# Patient Record
Sex: FEMALE | Race: WHITE | NOT HISPANIC OR LATINO | Employment: PART TIME | URBAN - METROPOLITAN AREA
[De-identification: names, ages, dates, MRNs, and addresses within clinical notes are randomized per-mention and may not be internally consistent; named-entity substitution may affect disease eponyms.]

---

## 2017-09-25 ENCOUNTER — APPOINTMENT (OUTPATIENT)
Dept: LAB | Facility: MEDICAL CENTER | Age: 32
End: 2017-09-25
Payer: COMMERCIAL

## 2017-09-25 ENCOUNTER — LAB REQUISITION (OUTPATIENT)
Dept: LAB | Facility: HOSPITAL | Age: 32
End: 2017-09-25
Payer: COMMERCIAL

## 2017-09-25 ENCOUNTER — GENERIC CONVERSION - ENCOUNTER (OUTPATIENT)
Dept: OTHER | Facility: OTHER | Age: 32
End: 2017-09-25

## 2017-09-25 DIAGNOSIS — Z11.3 ENCOUNTER FOR SCREENING FOR INFECTIONS WITH PREDOMINANTLY SEXUAL MODE OF TRANSMISSION: ICD-10-CM

## 2017-09-25 DIAGNOSIS — Z11.51 ENCOUNTER FOR SCREENING FOR HUMAN PAPILLOMAVIRUS (HPV): ICD-10-CM

## 2017-09-25 DIAGNOSIS — N92.6 IRREGULAR MENSTRUATION: ICD-10-CM

## 2017-09-25 DIAGNOSIS — Z01.419 ENCOUNTER FOR GYNECOLOGICAL EXAMINATION WITHOUT ABNORMAL FINDING: ICD-10-CM

## 2017-09-25 LAB — TSH SERPL DL<=0.05 MIU/L-ACNC: 1.66 UIU/ML (ref 0.36–3.74)

## 2017-09-25 PROCEDURE — 87624 HPV HI-RISK TYP POOLED RSLT: CPT | Performed by: PHYSICIAN ASSISTANT

## 2017-09-25 PROCEDURE — 87491 CHLMYD TRACH DNA AMP PROBE: CPT | Performed by: PHYSICIAN ASSISTANT

## 2017-09-25 PROCEDURE — 87591 N.GONORRHOEAE DNA AMP PROB: CPT | Performed by: PHYSICIAN ASSISTANT

## 2017-09-25 PROCEDURE — 36415 COLL VENOUS BLD VENIPUNCTURE: CPT

## 2017-09-25 PROCEDURE — 84443 ASSAY THYROID STIM HORMONE: CPT

## 2017-09-25 PROCEDURE — G0145 SCR C/V CYTO,THINLAYER,RESCR: HCPCS | Performed by: PHYSICIAN ASSISTANT

## 2017-09-28 LAB
CHLAMYDIA DNA CVX QL NAA+PROBE: NORMAL
N GONORRHOEA DNA GENITAL QL NAA+PROBE: NORMAL

## 2017-10-02 LAB — HPV RRNA GENITAL QL NAA+PROBE: NORMAL

## 2017-10-10 ENCOUNTER — GENERIC CONVERSION - ENCOUNTER (OUTPATIENT)
Dept: OTHER | Facility: OTHER | Age: 32
End: 2017-10-10

## 2017-10-10 LAB
LAB AP GYN PRIMARY INTERPRETATION: NORMAL
LAB AP LMP: NORMAL
Lab: NORMAL
PATH INTERP SPEC-IMP: NORMAL

## 2017-10-25 ENCOUNTER — GENERIC CONVERSION - ENCOUNTER (OUTPATIENT)
Dept: OTHER | Facility: OTHER | Age: 32
End: 2017-10-25

## 2018-01-13 NOTE — MISCELLANEOUS
Message   Recorded as Task   Date: 10/25/2017 12:41 PM, Created By: Moreno Thorne   Task Name: Medical Complaint Callback   Assigned To: Sonya Durant   Regarding Patient: Portia Lee, Status: In Progress   Comment:    Meena Smith - 25 Oct 2017 12:41 PM     TASK CREATED  Pt called office today, left voicemail message today  Pt saw YOSVANY Manilya on 9/25/17, scheduled to see her again on 10/1/18  Pt states her period arrived shortly after office visit  Pt states she is still bleeding, passing clots, is concerned  Pt further states she stopped receiving Depo-Provera injections about 1 &  1/2 years ago, menses regular until 7/2017 (menses irregular for past two months)  Pt can be reached @ [phone]  Thank you! [facility] [employer] - 25 Oct 2017 1:00 PM     TASK IN PROGRESS   [facility] [employer] - 25 Oct 2017 1:10 PM     TASK EDITED  Pt began bleeding 9/26  Had not had menses in 3 mos  Said she did lots of pregnancy tests 6 weeks ago and not sexually active since? ??  Pt bled heavily 9/26 for 1 week - bled lightly - now bleeding very heavily again  Used 4 this am  Pt passing clots  Pt doing hpt and will cb   [facility] [employer] - 25 Oct 2017 1:11 PM     TASK EDITED  Await cb   [facility] [employer] - 25 Oct 2017 1:35 PM     TASK EDITED  Pt says she has been bleeding for 1 full mo - off and on  [facility] - 25 Oct 2017 1:46 PM     TASK EDITED  Pts hpt neg  What to do??   Lety Cheung - 25 Oct 2017 2:07 PM     TASK REPLIED TO: Previously Assigned To [address] [place] [address]  did pt ever go for US I ordered? If not go for that and order CBC as well, then needs office visit  [facility] [employer] - 25 Oct 2017 2:28 PM     TASK EDITED  Pt will have u/s - and cbc  Slip faxed to [facility] CARRERA  Pt given ov        Active Problems    1  Birth control (V25 9) (Z30 9)   2  Encounter for gynecological examination without abnormal finding (V72 31) (Z01 419)   3   Encounter for routine gynecological examination with Papanicolaou smear of cervix   (V72 31,V76 2) (Z01 419)   4  General counseling for initiation of other contraceptive measures (V25 02) (Z30 09)   5  Irregular menses (626 4) (N92 6)   6  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)   7  Post concussion syndrome (310 2) (F07 81)   8  Scar of eyelid (374 46) (H02 59)   9  Screening for HPV (human papillomavirus) (V73 81) (Z11 51)   10  Screening for STD (sexually transmitted disease) (V74 5) (Z11 3)    Current Meds   1  BusPIRone HCl - 15 MG Oral Tablet; Therapy: (Recorded:[id number]) to Recorded    Allergies    1  No Known Drug Allergies    2  No Known Environmental Allergies   3  No Known Food Allergies    Plan  Irregular menses    · (1) CBC/PLT/DIFF; Status:Active - Retrospective Authorization; Requested  [id number];     Signatures   Electronically signed by :  Richi Del Toro, ; Oct 25 2017  2:30PM EST                       (Author)

## 2018-01-18 NOTE — MISCELLANEOUS
Message   Recorded as Task   Date: 10/10/2017 01:32 PM, Created By: Sebastian Ibarra   Task Name: Go to Result   Assigned To: [facility],Team   Regarding Patient: Mahi Gaitan, Status: In Progress   Comment:    Lauren Milner - 10 Oct 2017 1:32 PM     TASK CREATED  pap ascus neg hpv - will repeat pap in 3 years  This is ermelinda's pt   Lisa Smith - 10 Oct 2017 1:32 PM     TASK IN PROGRESS   Lisa Smith - 10 Oct 2017 1:34 PM     TASK EDITED  Per HIPPA form - lm of pap results and W87 recommendations  Active Problems    1  Birth control (V25 9) (Z30 9)   2  Encounter for gynecological examination without abnormal finding (V72 31) (Z01 419)   3  Encounter for routine gynecological examination with Papanicolaou smear of cervix   (V72 31,V76 2) (Z01 419)   4  General counseling for initiation of other contraceptive measures (V25 02) (Z30 09)   5  Irregular menses (626 4) (N92 6)   6  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

## 2018-01-22 VITALS
SYSTOLIC BLOOD PRESSURE: 106 MMHG | HEIGHT: 67 IN | BODY MASS INDEX: 28.72 KG/M2 | DIASTOLIC BLOOD PRESSURE: 64 MMHG | WEIGHT: 183 LBS

## 2018-03-28 ENCOUNTER — OFFICE VISIT (OUTPATIENT)
Dept: URGENT CARE | Facility: MEDICAL CENTER | Age: 33
End: 2018-03-28
Payer: COMMERCIAL

## 2018-03-28 VITALS
BODY MASS INDEX: 30 KG/M2 | HEART RATE: 70 BPM | HEIGHT: 67 IN | SYSTOLIC BLOOD PRESSURE: 110 MMHG | RESPIRATION RATE: 20 BRPM | DIASTOLIC BLOOD PRESSURE: 66 MMHG | WEIGHT: 191.13 LBS | OXYGEN SATURATION: 99 % | TEMPERATURE: 97.6 F

## 2018-03-28 DIAGNOSIS — B86 SCABIES: Primary | ICD-10-CM

## 2018-03-28 PROCEDURE — S9088 SERVICES PROVIDED IN URGENT: HCPCS | Performed by: FAMILY MEDICINE

## 2018-03-28 PROCEDURE — 99213 OFFICE O/P EST LOW 20 MIN: CPT | Performed by: FAMILY MEDICINE

## 2018-03-28 RX ORDER — PERMETHRIN 50 MG/G
CREAM TOPICAL ONCE
Qty: 60 G | Refills: 0 | Status: SHIPPED | OUTPATIENT
Start: 2018-03-28 | End: 2018-03-28

## 2018-03-28 NOTE — PROGRESS NOTES
Started feeling itchy past 2 months   Spots on chest , back and arms didn't show up until past few days  C/o itchy worse at night

## 2018-03-28 NOTE — LETTER
March 28, 2018     Patient: Lionel Montenegro   YOB: 1985   Date of Visit: 3/28/2018       To Whom it May Concern:    Nella Platt was seen in my clinic on 3/28/2018  She may return to work on Please excuse illness  If you have any questions or concerns, please don't hesitate to call           Sincerely,          [facility]        CC: No Recipients

## 2018-03-28 NOTE — PROGRESS NOTES
[facility]        NAME: Megan Merino is a 27 y o  female  : 1985    MRN: [id number]  DATE: 2018  TIME: 10:28 AM    Assessment and Plan   Scabies [B86]  1  Scabies  permethrin (ELIMITE) 5 % cream         Patient Instructions     Use permethrin cream as directed  Follow up with PCP in 3-5 days  Proceed to  ER if symptoms worsen  Chief Complaint     Chief Complaint   Patient presents with    Rash     past 2 months          History of Present Illness       Rash   This is a new problem  The current episode started more than 1 month ago  The problem is unchanged  The affected locations include the left wrist, right wrist, back and chest  The rash is characterized by itchiness and redness  She was exposed to nothing  Pertinent negatives include no congestion, eye pain, fatigue, fever, shortness of breath, sore throat or vomiting  (Itching is worse at night  Itching relieved once skin is broken  ) There is no history of allergies, asthma, eczema or varicella  Review of Systems   Review of Systems   Constitutional: Negative for chills, fatigue and fever  HENT: Negative for congestion, ear pain, hearing loss, postnasal drip, sinus pain, sinus pressure and sore throat  Eyes: Negative for pain and discharge  Respiratory: Negative for chest tightness and shortness of breath  Cardiovascular: Negative for chest pain  Gastrointestinal: Negative for abdominal pain, constipation, nausea and vomiting  Genitourinary: Negative for difficulty urinating  Musculoskeletal: Negative for arthralgias and myalgias  Skin: Positive for rash  Neurological: Negative for dizziness and headaches  Psychiatric/Behavioral: Negative for behavioral problems           Current Medications       Current Outpatient Prescriptions:     permethrin (ELIMITE) 5 % cream, Apply topically once for 1 dose, Disp: 60 g, Rfl: 0    Current Allergies     Allergies as of 2018    (No Known Allergies)            The following portions of the patient's history were reviewed and updated as appropriate: allergies, current medications, past family history, past medical history, past social history, past surgical history and problem list      History reviewed  No pertinent past medical history  History reviewed  No pertinent surgical history  No family history on file  Medications have been verified  Objective   /66   Pulse 70   Temp 97 6 °F (36 4 °C) (Temporal)   Resp 20   Ht 5' 7" (1 702 m)   Wt 86 7 kg (191 lb 2 oz)   SpO2 99%   BMI 29 93 kg/m²        Physical Exam     Physical Exam   Constitutional: She is oriented to person, place, and time  She appears well-developed and well-nourished  No distress  Cardiovascular: Normal rate, regular rhythm and normal heart sounds  Pulmonary/Chest: Effort normal and breath sounds normal  No respiratory distress  She has no wheezes  She has no rales  Musculoskeletal: She exhibits no tenderness  Neurological: She is alert and oriented to person, place, and time  Skin: Rash noted  Rash is papular  Erythematous papular rash on b/l forearm, chest and lower back with scabbing  Nursing note and vitals reviewed

## 2018-03-28 NOTE — PATIENT INSTRUCTIONS
Use permethrin cream as directed  Scabies   WHAT YOU NEED TO KNOW:   Scabies is a skin condition that is caused by scabies mites  Scabies mites are tiny bugs that burrow, lay eggs, and live underneath the skin  Scabies is spread through close contact with a person who has scabies  This includes having sex, sleeping in the same bed, or sharing towels or clothing  Scabies can spread quickly and must be treated as soon as it is found  DISCHARGE INSTRUCTIONS:   Return to the emergency department if:   · You develop a fever and red, swollen, painful areas on your skin  Contact your healthcare provider if:   · The bites become crusty or filled with pus  · You have worsening itching after scabies treatment  · You have new bite or burrow marks after treatment  · You have questions or concerns about your condition or care  Medicines:   · Prescription creams  are used to treat scabies  You will need to apply them over all of your body from the neck down  Do not swallow this medicine  An oral medication may be ordered if scabies is severe  · Take your medicine as directed  Contact your healthcare provider if you think your medicine is not helping or if you have side effects  Tell him or her if you are allergic to any medicine  Keep a list of the medicines, vitamins, and herbs you take  Include the amounts, and when and why you take them  Bring the list or the pill bottles to follow-up visits  Carry your medicine list with you in case of an emergency  Follow up with your healthcare provider as directed:  Write down your questions so you remember to ask them during your visits  Prevent the spread of scabies:   · Have all family members use scabies medicine  Tell all sex partners and anyone who has shared your clothing or bed for the past month about the scabies  Tell them to ask their healthcare provider for scabies medicine even if they have no itching, rash, or burrow marks      · Wash all items that you have used since 3 days before you learned about your scabies  Use hot water to wash all clothing, bedding, and towels  Dry them for at least 20 minutes on the hot cycle of a dryer  Take items to be dry cleaned that cannot be washed in a washing machine  Place any clothing or bedding that cannot be washed or dry cleaned in a closed plastic bag for 1 week  · Do not have close body contact with anyone until the scabies mites are gone  Talk to your healthcare provider about how long you need to wait  Also ask about public places to avoid, such as the gym  Help relieve itching: Your skin may continue to itch for 2 or 3 weeks, even after the scabies mites are gone  Over-the-counter antihistamines or cortisone cream may help relieve itching  Trim your fingernails so you do not spread any mites that are still alive after treatment  Do not scratch your skin  Scratches may cause a skin infection  A cool bath may also help relieve the itching  Return to school or work: You may return to school or work 24 hours after using scabies medicine  © 2017 [facility] Information is for End User's use only and may not be sold, redistributed or otherwise used for commercial purposes  All illustrations and images included in CareNotes® are the copyrighted property of [employer] , [employer]  or Zack Sood  The above information is an  only  It is not intended as medical advice for individual conditions or treatments  Talk to your doctor, nurse or pharmacist before following any medical regimen to see if it is safe and effective for you

## 2018-04-24 ENCOUNTER — OFFICE VISIT (OUTPATIENT)
Dept: OBGYN CLINIC | Facility: CLINIC | Age: 33
End: 2018-04-24
Payer: COMMERCIAL

## 2018-04-24 VITALS
DIASTOLIC BLOOD PRESSURE: 68 MMHG | SYSTOLIC BLOOD PRESSURE: 116 MMHG | HEIGHT: 67 IN | BODY MASS INDEX: 29.82 KG/M2 | WEIGHT: 190 LBS

## 2018-04-24 DIAGNOSIS — N93.8 DUB (DYSFUNCTIONAL UTERINE BLEEDING): Primary | ICD-10-CM

## 2018-04-24 LAB — SL AMB POCT URINE HCG: NEGATIVE

## 2018-04-24 PROCEDURE — 81025 URINE PREGNANCY TEST: CPT | Performed by: NURSE PRACTITIONER

## 2018-04-24 PROCEDURE — 99214 OFFICE O/P EST MOD 30 MIN: CPT | Performed by: NURSE PRACTITIONER

## 2018-04-24 RX ORDER — MEDROXYPROGESTERONE ACETATE 10 MG/1
10 TABLET ORAL DAILY
Qty: 10 TABLET | Refills: 0 | Status: SHIPPED | OUTPATIENT
Start: 2018-04-24 | End: 2018-05-08 | Stop reason: HOSPADM

## 2018-04-24 RX ORDER — NORGESTIMATE AND ETHINYL ESTRADIOL 7DAYSX3 LO
1 KIT ORAL DAILY
Qty: 90 TABLET | Refills: 3 | Status: SHIPPED | OUTPATIENT
Start: 2018-04-24 | End: 2018-05-08 | Stop reason: HOSPADM

## 2018-04-24 RX ORDER — DIPHENOXYLATE HYDROCHLORIDE AND ATROPINE SULFATE 2.5; .025 MG/1; MG/1
1 TABLET ORAL DAILY
COMMUNITY
End: 2018-12-05 | Stop reason: ALTCHOICE

## 2018-04-24 NOTE — ASSESSMENT & PLAN NOTE
This patient presents with c/o DUB x8 mos  Exam is WNL  Reviewed common causes of DUB  Advised pelvic Us, TSH and CBC  Start Provera 10x10 today  Reviewed options for medical management of DUB and their risks/benefits were reviewed in detail; all questions were answered  Alley Lopez elects trial of OCP, which she has tolerated in the past  She denies personal/FH VTE risks  We will f/u as needed if this is ineffective

## 2018-04-24 NOTE — PROGRESS NOTES
Assessment/Plan:    DUB (dysfunctional uterine bleeding)  This patient presents with c/o DUB x8 mos  Exam is WNL  Reviewed common causes of DUB  Advised pelvic Us, TSH and CBC  Start Provera 10x10 today  Reviewed options for medical management of DUB and their risks/benefits were reviewed in detail; all questions were answered  Cordell Dobson elects trial of OCP, which she has tolerated in the past  She denies personal/FH VTE risks  We will f/u as needed if this is ineffective  25 mins of time was devoted to this visit, of which >50% was spent counseling face to face  Diagnoses and all orders for this visit:    DUB (dysfunctional uterine bleeding)  -     US pelvis complete w transvaginal; Future  -     CBC; Future  -     TSH, 3rd generation; Future  -     medroxyPROGESTERone (PROVERA) 10 mg tablet; Take 1 tablet (10 mg total) by mouth daily  -     norgestimate-ethinyl estradiol (ORTHO TRI-CYCLEN LO) 0 18/0 215/0 25 MG-25 MCG per tablet; Take 1 tablet by mouth daily    Other orders  -     multivitamin (THERAGRAN) TABS; Take 1 tablet by mouth daily  -     IODINE EX; Apply topically          Subjective:      Patient ID: Mauricio Antoine is a 27 y o  female  This patient presents with c/o daily bleeding for approx 8 mos  Menses have historically been reg  She was previously on Depo provera for years and was amenorrheic; stopped this about 3 yrs ago and transitioned to condoms  Post-depo she reports menses were very light, x2 days and occurring monthly  Onset of current sx occurred suddenly  She describes heavy bleeding and clots occurring daily  Denies sx of anemia or hypovolemia; denies pelvic pain, abn discharge, STI concerns; denies personal h/o endocrine dysfunction  She denies personal/FH bleeding dyscrasias  Mother has h/o DUB           The following portions of the patient's history were reviewed and updated as appropriate: allergies, current medications, past family history, past medical history, past social history, past surgical history and problem list     Review of Systems   Constitutional: Negative  HENT: Negative  Eyes: Negative  Respiratory: Negative  Cardiovascular: Negative  Gastrointestinal: Negative  Endocrine: Negative  Genitourinary: Positive for menstrual problem and vaginal bleeding  Negative for dysuria, frequency, pelvic pain, urgency and vaginal discharge  Musculoskeletal: Negative  Skin: Negative  Allergic/Immunologic: Negative  Neurological: Negative  Hematological: Negative  Psychiatric/Behavioral: Negative  Objective:      /68 (BP Location: Right arm)   Ht 5' 7" (1 702 m)   Wt 86 2 kg (190 lb)   LMP 09/26/2017   BMI 29 76 kg/m²          Physical Exam   Constitutional: She is oriented to person, place, and time  She appears well-developed and well-nourished  HENT:   Head: Normocephalic and atraumatic  Eyes: Pupils are equal, round, and reactive to light  Neck: Normal range of motion  Pulmonary/Chest: Effort normal    Abdominal: Hernia confirmed negative in the right inguinal area and confirmed negative in the left inguinal area  Genitourinary: Rectum normal and uterus normal        There is no rash, tenderness, lesion or injury on the right labia  There is no rash, tenderness, lesion or injury on the left labia  Cervix exhibits no motion tenderness, no discharge and no friability  Right adnexum displays no mass, no tenderness and no fullness  Left adnexum displays no mass, no tenderness and no fullness  There is bleeding in the vagina  No erythema or tenderness in the vagina  No vaginal discharge found  Musculoskeletal: Normal range of motion  Lymphadenopathy:        Right: No inguinal adenopathy present  Left: No inguinal adenopathy present  Neurological: She is alert and oriented to person, place, and time  Skin: Skin is warm and dry  Psychiatric: She has a normal mood and affect   Her behavior is normal  Judgment and thought content normal

## 2018-05-02 LAB
ERYTHROCYTE [DISTWIDTH] IN BLOOD BY AUTOMATED COUNT: 13.1 % (ref 11–15)
HCT VFR BLD AUTO: 28.2 % (ref 35–45)
HGB BLD-MCNC: 9.2 G/DL (ref 11.7–15.5)
MCH RBC QN AUTO: 29.5 PG (ref 27–33)
MCHC RBC AUTO-ENTMCNC: 32.6 G/DL (ref 32–36)
MCV RBC AUTO: 90.4 FL (ref 80–100)
PLATELET # BLD AUTO: 372 THOUSAND/UL (ref 140–400)
PMV BLD REES-ECKER: 9.9 FL (ref 7.5–12.5)
RBC # BLD AUTO: 3.12 MILLION/UL (ref 3.8–5.1)
TSH SERPL-ACNC: 2.73 MIU/L
WBC # BLD AUTO: 7.3 THOUSAND/UL (ref 3.8–10.8)

## 2018-05-04 ENCOUNTER — TELEPHONE (OUTPATIENT)
Dept: OBGYN CLINIC | Facility: CLINIC | Age: 33
End: 2018-05-04

## 2018-05-04 NOTE — TELEPHONE ENCOUNTER
----- Message from Jen Blum, [address] sent at 5/4/2018  1:01 PM EDT -----  This was ordered for DUB x8 mos   CBC with anemia - please encourage starting Fe supplement daily; once bleeding is under control we can recheck CBC    TSH is WNL   Please encourage her to follow through with US   Please inquire about bleeding status - she started Provera and was to begin OCP after 10d of Provera

## 2018-05-04 NOTE — TELEPHONE ENCOUNTER
Patient called  Left message on machine that she is still bleeding on Day # 10 of Provera  Stated changing tampon hourly and going out of town tomorrow  Patient did not realize we had left her a message at 1:00p m  Left message for patient to call back  Advised if dizzy, sob or chest pain to go to E R  Can call to speak with on call provider as well

## 2018-05-07 ENCOUNTER — HOSPITAL ENCOUNTER (OUTPATIENT)
Facility: HOSPITAL | Age: 33
Setting detail: OBSERVATION
Discharge: HOME | End: 2018-05-08
Attending: EMERGENCY MEDICINE | Admitting: OBSTETRICS & GYNECOLOGY
Payer: COMMERCIAL

## 2018-05-07 ENCOUNTER — TELEPHONE (OUTPATIENT)
Dept: OBGYN CLINIC | Facility: CLINIC | Age: 33
End: 2018-05-07

## 2018-05-07 DIAGNOSIS — D62 ANEMIA ASSOCIATED WITH ACUTE BLOOD LOSS: ICD-10-CM

## 2018-05-07 DIAGNOSIS — N93.8 DUB (DYSFUNCTIONAL UTERINE BLEEDING): Primary | ICD-10-CM

## 2018-05-07 LAB
ABO GROUP BLD: NORMAL
ALBUMIN SERPL BCP-MCNC: 3.4 G/DL (ref 3.5–5)
ALP SERPL-CCNC: 58 U/L (ref 46–116)
ALT SERPL W P-5'-P-CCNC: 19 U/L (ref 12–78)
ANION GAP SERPL CALCULATED.3IONS-SCNC: 7 MMOL/L (ref 4–13)
APTT PPP: 24 SECONDS (ref 23–35)
AST SERPL W P-5'-P-CCNC: 21 U/L (ref 5–45)
BACTERIA UR QL AUTO: ABNORMAL /HPF
BASOPHILS # BLD AUTO: 0.02 THOUSANDS/ΜL (ref 0–0.1)
BASOPHILS NFR BLD AUTO: 0 % (ref 0–1)
BILIRUB SERPL-MCNC: 0.19 MG/DL (ref 0.2–1)
BILIRUB UR QL STRIP: NEGATIVE
BLD GP AB SCN SERPL QL: NEGATIVE
BUN SERPL-MCNC: 12 MG/DL (ref 5–25)
CALCIUM SERPL-MCNC: 8.5 MG/DL (ref 8.3–10.1)
CHLORIDE SERPL-SCNC: 107 MMOL/L (ref 100–108)
CLARITY UR: CLEAR
CO2 SERPL-SCNC: 26 MMOL/L (ref 21–32)
COLOR UR: YELLOW
CREAT SERPL-MCNC: 0.63 MG/DL (ref 0.6–1.3)
EOSINOPHIL # BLD AUTO: 0.04 THOUSAND/ΜL (ref 0–0.61)
EOSINOPHIL NFR BLD AUTO: 1 % (ref 0–6)
ERYTHROCYTE [DISTWIDTH] IN BLOOD BY AUTOMATED COUNT: 13.8 % (ref 11.6–15.1)
EXT PREG TEST URINE: NORMAL
GFR SERPL CREATININE-BSD FRML MDRD: 117 ML/MIN/1.73SQ M
GLUCOSE SERPL-MCNC: 81 MG/DL (ref 65–140)
GLUCOSE UR STRIP-MCNC: NEGATIVE MG/DL
HCT VFR BLD AUTO: 20.8 % (ref 34.8–46.1)
HGB BLD-MCNC: 6.7 G/DL (ref 11.5–15.4)
HGB UR QL STRIP.AUTO: ABNORMAL
HYALINE CASTS #/AREA URNS LPF: ABNORMAL /LPF
INR PPP: 0.91 (ref 0.86–1.16)
KETONES UR STRIP-MCNC: NEGATIVE MG/DL
LEUKOCYTE ESTERASE UR QL STRIP: NEGATIVE
LYMPHOCYTES # BLD AUTO: 2.61 THOUSANDS/ΜL (ref 0.6–4.47)
LYMPHOCYTES NFR BLD AUTO: 34 % (ref 14–44)
MCH RBC QN AUTO: 28.9 PG (ref 26.8–34.3)
MCHC RBC AUTO-ENTMCNC: 32.2 G/DL (ref 31.4–37.4)
MCV RBC AUTO: 90 FL (ref 82–98)
MONOCYTES # BLD AUTO: 0.41 THOUSAND/ΜL (ref 0.17–1.22)
MONOCYTES NFR BLD AUTO: 5 % (ref 4–12)
NEUTROPHILS # BLD AUTO: 4.54 THOUSANDS/ΜL (ref 1.85–7.62)
NEUTS SEG NFR BLD AUTO: 60 % (ref 43–75)
NITRITE UR QL STRIP: NEGATIVE
NON-SQ EPI CELLS URNS QL MICRO: ABNORMAL /HPF
NRBC BLD AUTO-RTO: 0 /100 WBCS
PH UR STRIP.AUTO: 6 [PH] (ref 4.5–8)
PLATELET # BLD AUTO: 330 THOUSANDS/UL (ref 149–390)
PMV BLD AUTO: 9.2 FL (ref 8.9–12.7)
POTASSIUM SERPL-SCNC: 3.7 MMOL/L (ref 3.5–5.3)
PROT SERPL-MCNC: 6.8 G/DL (ref 6.4–8.2)
PROT UR STRIP-MCNC: NEGATIVE MG/DL
PROTHROMBIN TIME: 12.3 SECONDS (ref 12.1–14.4)
RBC # BLD AUTO: 2.32 MILLION/UL (ref 3.81–5.12)
RBC #/AREA URNS AUTO: ABNORMAL /HPF
RH BLD: POSITIVE
SODIUM SERPL-SCNC: 140 MMOL/L (ref 136–145)
SP GR UR STRIP.AUTO: 1.01 (ref 1–1.03)
SPECIMEN EXPIRATION DATE: NORMAL
UROBILINOGEN UR QL STRIP.AUTO: 0.2 E.U./DL
WBC # BLD AUTO: 7.63 THOUSAND/UL (ref 4.31–10.16)
WBC #/AREA URNS AUTO: ABNORMAL /HPF

## 2018-05-07 PROCEDURE — P9021 RED BLOOD CELLS UNIT: HCPCS

## 2018-05-07 PROCEDURE — 81025 URINE PREGNANCY TEST: CPT | Performed by: EMERGENCY MEDICINE

## 2018-05-07 PROCEDURE — 36415 COLL VENOUS BLD VENIPUNCTURE: CPT | Performed by: EMERGENCY MEDICINE

## 2018-05-07 PROCEDURE — 85025 COMPLETE CBC W/AUTO DIFF WBC: CPT | Performed by: EMERGENCY MEDICINE

## 2018-05-07 PROCEDURE — 36430 TRANSFUSION BLD/BLD COMPNT: CPT

## 2018-05-07 PROCEDURE — 86901 BLOOD TYPING SEROLOGIC RH(D): CPT | Performed by: EMERGENCY MEDICINE

## 2018-05-07 PROCEDURE — 81001 URINALYSIS AUTO W/SCOPE: CPT

## 2018-05-07 PROCEDURE — 85730 THROMBOPLASTIN TIME PARTIAL: CPT | Performed by: EMERGENCY MEDICINE

## 2018-05-07 PROCEDURE — 85610 PROTHROMBIN TIME: CPT | Performed by: EMERGENCY MEDICINE

## 2018-05-07 PROCEDURE — 80053 COMPREHEN METABOLIC PANEL: CPT | Performed by: EMERGENCY MEDICINE

## 2018-05-07 PROCEDURE — 99218 PR INITIAL OBSERVATION CARE/DAY 30 MINUTES: CPT | Performed by: OBSTETRICS & GYNECOLOGY

## 2018-05-07 PROCEDURE — 86850 RBC ANTIBODY SCREEN: CPT | Performed by: EMERGENCY MEDICINE

## 2018-05-07 PROCEDURE — 86900 BLOOD TYPING SEROLOGIC ABO: CPT | Performed by: EMERGENCY MEDICINE

## 2018-05-07 PROCEDURE — 86923 COMPATIBILITY TEST ELECTRIC: CPT

## 2018-05-07 PROCEDURE — 96360 HYDRATION IV INFUSION INIT: CPT

## 2018-05-07 RX ORDER — SODIUM CHLORIDE, SODIUM LACTATE, POTASSIUM CHLORIDE, CALCIUM CHLORIDE 600; 310; 30; 20 MG/100ML; MG/100ML; MG/100ML; MG/100ML
125 INJECTION, SOLUTION INTRAVENOUS CONTINUOUS
Status: DISCONTINUED | OUTPATIENT
Start: 2018-05-07 | End: 2018-05-08 | Stop reason: HOSPADM

## 2018-05-07 RX ORDER — DIPHENHYDRAMINE HCL 25 MG
25 TABLET ORAL ONCE
Status: COMPLETED | OUTPATIENT
Start: 2018-05-07 | End: 2018-05-08

## 2018-05-07 RX ORDER — ACETAMINOPHEN 325 MG/1
650 TABLET ORAL ONCE
Status: COMPLETED | OUTPATIENT
Start: 2018-05-07 | End: 2018-05-07

## 2018-05-07 RX ADMIN — ACETAMINOPHEN 650 MG: 325 TABLET, FILM COATED ORAL at 21:27

## 2018-05-07 RX ADMIN — SODIUM CHLORIDE, SODIUM LACTATE, POTASSIUM CHLORIDE, AND CALCIUM CHLORIDE 125 ML/HR: .6; .31; .03; .02 INJECTION, SOLUTION INTRAVENOUS at 21:24

## 2018-05-07 RX ADMIN — SODIUM CHLORIDE 500 ML: 0.9 INJECTION, SOLUTION INTRAVENOUS at 18:41

## 2018-05-07 RX ADMIN — TRANEXAMIC ACID 1000 MG: 100 INJECTION, SOLUTION INTRAVENOUS at 22:45

## 2018-05-07 NOTE — TELEPHONE ENCOUNTER
Pt is still in [place] - was at a competition for daughter - and is massively bleeding  10 to 15 pads / day  Pt was on provera 4/24 till 5/3  Then started on otc lo - is on day 4 and is still bleeding that heavily  CBC few days ago was 9 2   Pt wants to drive back and go straight to ED  What to do  Has otc lo with her    Thanks

## 2018-05-07 NOTE — ED PROVIDER NOTES
History  Chief Complaint   Patient presents with    Vaginal Bleeding     pt reports heavy vaginal bleeding since September  pt is going through 4 pads in an hour in last 2 days  pt reports lightheadedness and SOB  pt was contacted 1 week ago that her blood counts were low but pt has been out of town so could not come in until today  Patient is a 27year-old female with no significant PMH who presents for heavy vaginal bleeding  Patient reports 8 months of irregular, heavy bleeding  She was seen by her gynecologist on 4/24 and was started on 10 days provera, which she completed 4 days ago  She is now on day 4 of ortho tri-cyclan low  Labs were performed 5/1 and showed Hgb 9 1, negative pregnancy and STD tests, and normal TSH  Bleeding initially slowed slightly but has now worsened  She is currently soaking through a super tampon and pad every 15 minutes with passage of large clots  She also reports severe fatigue, lightheadedness, pallor, pelvic cramping, weakness, and shortness of breath  Prior to 8 months ago, periods were light  No history of heavy periods or other heavy bleeding  She does also report a nosebleed this morning, which she has never had before  C2S1 uncomplicated delivery of a daughter 13 years ago  Prior to Admission Medications   Prescriptions Last Dose Informant Patient Reported? Taking?    IODINE EX Unknown at Unknown time Self Yes No   Sig: Apply topically   medroxyPROGESTERone (PROVERA) 10 mg tablet   No Yes   Sig: Take 1 tablet (10 mg total) by mouth daily   multivitamin (THERAGRAN) TABS  Self Yes Yes   Sig: Take 1 tablet by mouth daily   norgestimate-ethinyl estradiol (ORTHO TRI-CYCLEN LO) 0 18/0 215/0 25 MG-25 MCG per tablet   No Yes   Sig: Take 1 tablet by mouth daily      Facility-Administered Medications: None       Past Medical History:   Diagnosis Date    Anxiety     Depression     Dysfunctional uterine bleeding        Past Surgical History:   Procedure Laterality Date    TONSILLECTOMY         Family History   Problem Relation Age of Onset    Asthma Mother     No Known Problems Father     No Known Problems Sister     No Known Problems Brother     No Known Problems Daughter     Stroke Maternal Grandmother     Testicular cancer Maternal Grandfather     Cancer Paternal Grandmother      I have reviewed and agree with the history as documented  Social History   Substance Use Topics    Smoking status: Former Smoker    Smokeless tobacco: Never Used    Alcohol use No      Comment: recovering        Review of Systems   Constitutional: Positive for fatigue  Negative for chills and fever  HENT: Negative for congestion and sore throat  Eyes: Negative for visual disturbance  Respiratory: Positive for shortness of breath  Negative for cough  Cardiovascular: Negative for chest pain  Gastrointestinal: Negative for abdominal pain, diarrhea, nausea and vomiting  Endocrine: Negative for polyuria  Genitourinary: Positive for menstrual problem, pelvic pain and vaginal bleeding  Negative for difficulty urinating and dysuria  Musculoskeletal: Negative for arthralgias  Skin: Positive for pallor  Negative for rash  Neurological: Positive for weakness and light-headedness  Negative for dizziness and headaches  All other systems reviewed and are negative  Physical Exam  ED Triage Vitals [05/07/18 1529]   Temperature Pulse Respirations Blood Pressure SpO2   97 8 °F (36 6 °C) 89 18 126/58 100 %      Temp Source Heart Rate Source Patient Position - Orthostatic VS BP Location FiO2 (%)   Tympanic Monitor Lying Right arm --      Pain Score       8           Orthostatic Vital Signs  Vitals:    05/07/18 2003 05/07/18 2017 05/07/18 2034 05/07/18 2049   BP:  100/57 111/57 105/54   Pulse: 96 82 82 76   Patient Position - Orthostatic VS:  Lying  Lying       Physical Exam   Constitutional: She is oriented to person, place, and time   She appears well-developed and well-nourished  No distress  HENT:   Head: Normocephalic and atraumatic  Eyes: EOM are normal  Pupils are equal, round, and reactive to light  Neck: Normal range of motion  Neck supple  Cardiovascular: Normal rate, regular rhythm and normal heart sounds  Pulmonary/Chest: Effort normal and breath sounds normal  No respiratory distress  Abdominal: Soft  Bowel sounds are normal  There is tenderness in the suprapubic area  There is no rigidity, no rebound and no guarding  Mild suprapubic tenderness  Genitourinary: There is bleeding in the vagina  Genitourinary Comments: Pooling of blood with large clots  No mass, sign of trauma, or other lesion  Musculoskeletal: Normal range of motion  Neurological: She is alert and oriented to person, place, and time  Skin: Skin is warm and dry  There is pallor  Psychiatric: She has a normal mood and affect  Nursing note and vitals reviewed        ED Medications  Medications   acetaminophen (TYLENOL) tablet 650 mg (not administered)   diphenhydrAMINE (BENADRYL) tablet 25 mg (not administered)   lactated ringers infusion (not administered)   tranexamic Acid 1,000 mg in sodium chloride 0 9 % 100 mL IVPB (not administered)   norgestrel-ethinyl estradiol (LO/OVRAL) 0 3 mg-30 mcg per tablet 1 tablet (not administered)   sodium chloride 0 9 % bolus 500 mL (0 mL Intravenous Stopped 5/7/18 2000)       Diagnostic Studies  Results Reviewed     Procedure Component Value Units Date/Time    Hemoglobin and hematocrit, blood [[id number]]     Lab Status:  No result Specimen:  Blood     CBC and differential [[id number]]  (Abnormal) Collected:  05/07/18 1651    Lab Status:  Final result Specimen:  Blood from Arm, Right Updated:  05/07/18 1814     WBC 7 63 Thousand/uL      RBC 2 32 (L) Million/uL      Hemoglobin 6 7 (LL) g/dL      Hematocrit 20 8 (L) %      MCV 90 fL      MCH 28 9 pg      MCHC 32 2 g/dL      RDW 13 8 %      MPV 9 2 fL      Platelets 527 Thousands/uL      nRBC 0 /100 WBCs      Neutrophils Relative 60 %      Lymphocytes Relative 34 %      Monocytes Relative 5 %      Eosinophils Relative 1 %      Basophils Relative 0 %      Neutrophils Absolute 4 54 Thousands/µL      Lymphocytes Absolute 2 61 Thousands/µL      Monocytes Absolute 0 41 Thousand/µL      Eosinophils Absolute 0 04 Thousand/µL      Basophils Absolute 0 02 Thousands/µL     Comprehensive metabolic panel [[id number]]  (Abnormal) Collected:  05/07/18 1651    Lab Status:  Final result Specimen:  Blood from Arm, Right Updated:  05/07/18 1729     Sodium 140 mmol/L      Potassium 3 7 mmol/L      Chloride 107 mmol/L      CO2 26 mmol/L      Anion Gap 7 mmol/L      BUN 12 mg/dL      Creatinine 0 63 mg/dL      Glucose 81 mg/dL      Calcium 8 5 mg/dL      AST 21 U/L      ALT 19 U/L      Alkaline Phosphatase 58 U/L      Total Protein 6 8 g/dL      Albumin 3 4 (L) g/dL      Total Bilirubin 0 19 (L) mg/dL      eGFR 117 ml/min/1 73sq m     Narrative:         National Kidney Disease Education Program recommendations are as follows:  GFR calculation is accurate only with a steady state creatinine  Chronic Kidney disease less than 60 ml/min/1 73 sq  meters  Kidney failure less than 15 ml/min/1 73 sq  meters      Protime-INR [[id number]]  (Normal) Collected:  05/07/18 1651    Lab Status:  Final result Specimen:  Blood from Arm, Right Updated:  05/07/18 1728     Protime 12 3 seconds      INR 0 91    APTT [[id number]]  (Normal) Collected:  05/07/18 1651    Lab Status:  Final result Specimen:  Blood from Arm, Right Updated:  05/07/18 1728     PTT 24 seconds     Urine Microscopic [[id number]]  (Abnormal) Collected:  05/07/18 1617    Lab Status:  Final result Specimen:  Urine from Urine, Clean Catch Updated:  05/07/18 1643     RBC, UA 4-10 (A) /hpf      WBC, UA None Seen /hpf      Epithelial Cells None Seen /hpf      Bacteria, UA None Seen /hpf      Hyaline Casts, UA None Seen /lpf     POCT pregnancy, urine [[id number]]  (Normal) Resulted:  05/07/18 1614    Lab Status:  Final result Updated:  05/07/18 1615     EXT PREG TEST UR (Ref: Negative) NEGATiVE (-)    ED Urine Macroscopic [[id number]]  (Abnormal) Collected:  05/07/18 1617    Lab Status:  Final result Specimen:  Urine Updated:  05/07/18 1613     Color, UA Yellow     Clarity, UA Clear     pH, UA 6 0     Leukocytes, UA Negative     Nitrite, UA Negative     Protein, UA Negative mg/dl      Glucose, UA Negative mg/dl      Ketones, UA Negative mg/dl      Urobilinogen, UA 0 2 E U /dl      Bilirubin, UA Negative     Blood, UA Moderate (A)     Specific [place], UA 1 010    Narrative:       CLINITEK RESULT                 No orders to display         Procedures  Procedures      Phone Consults  ED Phone Contact    ED Course                               MDM  Number of Diagnoses or Management Options  Anemia associated with acute blood loss:   DUB (dysfunctional uterine bleeding):   Diagnosis management comments: Patient is a 27year-old female with no significant PMH who presents for heavy vaginal bleeding  Normal vitals  Appears pale  Pelvic exam shows pooling of blood with large clots; no masses, signs of trauma or other lesions  Will obtain labs and consult gynecology  Hgb 6 7  Consent obtained for blood transfusion  Gynecology thought with the patient and will admit for further management      CritCare Time    Disposition  Final diagnoses:   DUB (dysfunctional uterine bleeding)   Anemia associated with acute blood loss     Time reflects when diagnosis was documented in both MDM as applicable and the Disposition within this note     Time User Action Codes Description Comment    5/7/2018  6:26 PM [facility] Add [N93 8] DUB (dysfunctional uterine bleeding)     5/7/2018  6:26 PM [facility] Modify [N93 8] DUB (dysfunctional uterine bleeding)     5/7/2018  6:26 PM [facility] Modify [N93 8] DUB (dysfunctional uterine bleeding)     5/7/2018  6:26 PM [facility] Add [D62] Anemia associated with acute blood loss       ED Disposition     None      Follow-up Information    None       Patient's Medications   Discharge Prescriptions    No medications on file     No discharge procedures on file  ED Provider  Attending physically available and evaluated Evy Garcia I managed the patient along with the ED Attending      Electronically Signed by         Tommy Lee MD  05/07/18 4887

## 2018-05-07 NOTE — ED ATTENDING ATTESTATION
Elia Gonzalez MD, saw and evaluated the patient  All available labs and X-rays were ordered by me or the resident and have been reviewed by myself  I discussed the patient with the resident / non-physician and agree with the resident's / non-physician practitioner's findings and plan as documented in the resident's / non-physician practicitioner's note, except where noted  At this point, I agree with the current assessment done in the ED  Chief Complaint   Patient presents with    Vaginal Bleeding     pt reports heavy vaginal bleeding since September  pt is going through 4 pads in an hour in last 2 days  pt reports lightheadedness and SOB  pt was contacted 1 week ago that her blood counts were low but pt has been out of town so could not come in until today  For the last 8 months has been having menometrorrhagia  She states that 2 years ago she had the Depo shot for birth control  Since then she has been having abnormal uterine cycles  She is  as of 13 years ago  She during this time, during the last 8 months, has been having very heavy irregular menstrual cycles and started be continuous  In the last few weeks it has been even worse than previously  She has she saw her OB on  in the clinic who did a pelvic exam, noted large amount of bleeding  She started 10 days of progesterone and then followed of with oral contraceptives, Ortho Tri-Cyclen Lo  The patient has been compliant with the medications  Despite this though she does not feel her bleeding is decreased  In fact in the last 3 or 4 days it has been significant higher than it was before  It did seem that the progesterone did help a little bit but after she finished the course, she returned back to the heavy cycle  She did have blood work done that demonstrated hemoglobin of 9, normal TSH  She had an ultrasound planned for tomorrow  She denies any fevers chills nausea vomiting    She has been eating well, drinking well   She does with exertion feel some lightheadedness and shortness of breath  She called her OB who then sent her here for evaluation for symptomatic anemia  She denies any falls or injuries  Denies urinary symptoms including burning itching pain or frequency  Denies any vaginal discharge apart from menses type product, and blood clots  She is not currently pregnant  PMH:  - AUB  - Depression/anxiety  PSH:  - tonsillectomy  Former smoker  No alcohol/drugs  PE:  Vitals:    05/08/18 0045 05/08/18 0100 05/08/18 0143 05/08/18 0700   BP: 105/55 108/61 107/56 109/59   BP Location: Right arm Right arm Left arm    Pulse: 81 82 75 63   Resp: 18 18 18 18   Temp: 98 4 °F (36 9 °C)  98 3 °F (36 8 °C) 99 1 °F (37 3 °C)   TempSrc:   Oral Oral   SpO2: 97% 97% 97% 98%   Weight:       General: VSS, NAD, awake, alert  Well-nourished, well-developed  Appears stated age  Speaking normally in full sentences  Head: Normocephalic, atraumatic, nontender  Eyes: PERRL, EOM-I  No diplopia  No hyphema  No subconjunctival hemorrhages  Symmetrical lids  ENT: Atraumatic external nose and ears  MMM  No malocclusion  No stridor  Normal phonation  No drooling  Normal swallowing  Neck: Symmetric, trachea midline  No JVD  CV: RRR  +S1/S2  No murmurs or gallops  Peripheral pulses +2 throughout  No chest wall tenderness  Lungs:   Unlabored No retractions  CTAB, lungs sounds equal bilateral    No tachypnea  Abd: +BS, soft  suprapubpic tenderness  ND  No heel strike present  MSK:   Normal gait  FROM   Back:   No rashes  Skin: Dry, intact  Neuro: AAOx3, GCS 15, CN II-XII grossly intact  Motor grossly intact  Psychiatric/Behavioral: Appropriate mood and affect   Exam: per resident  A:  - AUB  P:  - discuss OB  - repeat labs --> anemia? Transfusion (iron vs blood)  - vaginal exam per resident  - 13 point ROS was performed and all are normal unless stated in the history above  - Nursing note reviewed  Vitals reviewed  - Orders placed by myself and/or advanced practitioner / resident     - Previous chart was reviewed  - No language barrier    - History obtained from patient, mom,   - There are no limitations to the history obtained  - Critical care time: 40 minutes  - Critical care time was exclusive of seperately bilable procedures and treating other patients as well as teaching time  - Critical care was necessary to treat or prevent imminent or life-threatening deterioration of the following condition: hemorrhage  - Critical care time was spent personally by me on the following activities as well as the above as per the ED course and rest of chart: blood draw for specimens, obtaining history from patient / surrogate, developement of a treatment plan, discussions with consultants, evaluation of patient's response to the treatment, examination of the patient, ordering/performing treatements and interventions, re-evaluation of the patient's condition, review of old charts  Imaging reviewed  Final Diagnosis:  1  DUB (dysfunctional uterine bleeding)    2  Anemia associated with acute blood loss        ED Course as of May 09 2319   Mon May 07, 2018   1815 Hemoglobin: (!!) 6 7     Medications   sodium chloride 0 9 % bolus 500 mL (0 mL Intravenous Stopped 5/7/18 2000)   acetaminophen (TYLENOL) tablet 650 mg (650 mg Oral Given 5/7/18 2127)   diphenhydrAMINE (BENADRYL) tablet 25 mg (25 mg Oral Given 5/8/18 0042)   tranexamic Acid 1,000 mg in sodium chloride 0 9 % 100 mL IVPB (0 mg Intravenous Stopped 5/7/18 2307)     No orders to display     Orders Placed This Encounter   Procedures    Urine Microscopic    CBC and differential    Protime-INR    APTT    Comprehensive metabolic panel    CBC and differential    Consult to obstetrics / gynecology    POCT pregnancy, urine    Type and screen    Prepare RBC:Has consent been obtained? Yes, 1 Units    Prepare RBC:Has consent been obtained?  Yes, 1 Units    Place in Observation     Labs Reviewed   URINE MICROSCOPIC - Abnormal        Result Value Ref Range Status    RBC, UA 4-10 (*) None Seen, 0-5 /hpf Final    WBC, UA None Seen  None Seen, 0-5, 5-55, 5-65 /hpf Final    Epithelial Cells None Seen  None Seen, Occasional /hpf Final    Bacteria, UA None Seen  None Seen, Occasional /hpf Final    Hyaline Casts, UA None Seen  None Seen /lpf Final   CBC AND DIFFERENTIAL - Abnormal     WBC 7 63  4 31 - 10 16 Thousand/uL Final    RBC 2 32 (*) 3 81 - 5 12 Million/uL Final    Hemoglobin 6 7 (*) 11 5 - 15 4 g/dL Final    Comment: Results verified by repeat  This result has been called to [address] [place] [address] Drive by Carlos Levy on 05 07 2018 at [phone], and has been read back       Hematocrit 20 8 (*) 34 8 - 46 1 % Final    MCV 90  82 - 98 fL Final    MCH 28 9  26 8 - 34 3 pg Final    MCHC 32 2  31 4 - 37 4 g/dL Final    RDW 13 8  11 6 - 15 1 % Final    MPV 9 2  8 9 - 12 7 fL Final    Platelets 523  246 - 390 Thousands/uL Final    nRBC 0  /100 WBCs Final    Neutrophils Relative 60  43 - 75 % Final    Lymphocytes Relative 34  14 - 44 % Final    Monocytes Relative 5  4 - 12 % Final    Eosinophils Relative 1  0 - 6 % Final    Basophils Relative 0  0 - 1 % Final    Neutrophils Absolute 4 54  1 85 - 7 62 Thousands/µL Final    Lymphocytes Absolute 2 61  0 60 - 4 47 Thousands/µL Final    Monocytes Absolute 0 41  0 17 - 1 22 Thousand/µL Final    Eosinophils Absolute 0 04  0 00 - 0 61 Thousand/µL Final    Basophils Absolute 0 02  0 00 - 0 10 Thousands/µL Final   COMPREHENSIVE METABOLIC PANEL - Abnormal     Sodium 140  136 - 145 mmol/L Final    Potassium 3 7  3 5 - 5 3 mmol/L Final    Chloride 107  100 - 108 mmol/L Final    CO2 26  21 - 32 mmol/L Final    Anion Gap 7  4 - 13 mmol/L Final    BUN 12  5 - 25 mg/dL Final    Creatinine 0 63  0 60 - 1 30 mg/dL Final    Comment: Standardized to IDMS reference method    Glucose 81  65 - 140 mg/dL Final    Comment:   If the patient is fasting, the ADA then defines impaired fasting glucose as > 100 mg/dL and diabetes as > or equal to 123 mg/dL  Specimen collection should occur prior to Sulfasalazine administration due to the potential for falsely depressed results  Specimen collection should occur prior to Sulfapyridine administration due to the potential for falsely elevated results  Calcium 8 5  8 3 - 10 1 mg/dL Final    AST 21  5 - 45 U/L Final    Comment:   Specimen collection should occur prior to Sulfasalazine administration due to the potential for falsely depressed results  ALT 19  12 - 78 U/L Final    Comment:   Specimen collection should occur prior to Sulfasalazine and/or Sulfapyridine administration due to the potential for falsely depressed results  Alkaline Phosphatase 58  46 - 116 U/L Final    Total Protein 6 8  6 4 - 8 2 g/dL Final    Albumin 3 4 (*) 3 5 - 5 0 g/dL Final    Total Bilirubin 0 19 (*) 0 20 - 1 00 mg/dL Final    eGFR 117  ml/min/1 73sq m Final    Narrative:     National Kidney Disease Education Program recommendations are as follows:  GFR calculation is accurate only with a steady state creatinine  Chronic Kidney disease less than 60 ml/min/1 73 sq  meters  Kidney failure less than 15 ml/min/1 73 sq  meters     ED URINE MACROSCOPIC - Abnormal     Color, UA Yellow   Final    Clarity, UA Clear   Final    pH, UA 6 0  4 5 - 8 0 Final    Leukocytes, UA Negative  Negative Final    Nitrite, UA Negative  Negative Final    Protein, UA Negative  Negative mg/dl Final    Glucose, UA Negative  Negative mg/dl Final    Ketones, UA Negative  Negative mg/dl Final    Urobilinogen, UA 0 2  0 2, 1 0 E U /dl E U /dl Final    Bilirubin, UA Negative  Negative Final    Blood, UA Moderate (*) Negative Final    Specific [place], UA 1 010  1 003 - 1 030 Final    Narrative:     CLINITEK RESULT   PROTIME-INR - Normal    Protime 12 3  12 1 - 14 4 seconds Final    INR 0 91  0 86 - 1 16 Final   APTT - Normal    PTT 24  23 - 35 seconds Final    Comment: Therapeutic Heparin Range =  60-90 seconds   POCT PREGNANCY, URINE - Normal    EXT PREG TEST UR (Ref: Negative) NEGATiVE (-)   Final   TYPE AND SCREEN    ABO Grouping A   Final    Rh Factor Positive   Final    Antibody Screen Negative   Final    Specimen Expiration Date [id number]   Final     Time reflects when diagnosis was documented in both MDM as applicable and the Disposition within this note     Time User Action Codes Description Comment    5/7/2018  6:26 PM [facility] Add [N93 8] DUB (dysfunctional uterine bleeding)     5/7/2018  6:26 PM [facility] Modify [N93 8] DUB (dysfunctional uterine bleeding)     5/7/2018  6:26 PM [facility] Modify [N93 8] DUB (dysfunctional uterine bleeding)     5/7/2018  6:26 PM [facility] Add [D62] Anemia associated with acute blood loss       ED Disposition     None      Follow-up Information     Follow up With Specialties Details Why Contact Info    Jena Coe DO Obstetrics and Gynecology, Obstetrics, Gynecology Schedule an appointment as soon as possible for a visit in 1 week(s)  [address] [place] [address] Sheldon Stubbs [address] [place] [address]      [facility] and Gyne[facility], Obstetrics, Gynecology   [address]  [phone]          Discharge Medication List as of 5/8/2018 11:37 AM      START taking these medications    Details   norgestrel-ethinyl estradiol (LO/OVRAL) 0 3 mg-30 mcg per tablet Take 1 tablet by mouth daily, Starting Tue 5/8/2018, Print         CONTINUE these medications which have NOT CHANGED    Details   IODINE EX Apply topically, Historical Med      multivitamin (THERAGRAN) TABS Take 1 tablet by mouth daily, Historical Med         STOP taking these medications       medroxyPROGESTERone (PROVERA) 10 mg tablet Comments:   Reason for Stopping:         norgestimate-ethinyl estradiol (ORTHO TRI-CYCLEN LO) 0 18/0 215/0 25 MG-25 MCG per tablet Comments:   Reason for Stopping: No discharge procedures on file  Prior to Admission Medications   Prescriptions Last Dose Informant Patient Reported? Taking? IODINE EX Unknown at Unknown time Self Yes No   Sig: Apply topically   medroxyPROGESTERone (PROVERA) 10 mg tablet Past Month at Unknown time  No Yes   Sig: Take 1 tablet (10 mg total) by mouth daily   multivitamin (THERAGRAN) TABS  Self Yes Yes   Sig: Take 1 tablet by mouth daily   norgestimate-ethinyl estradiol (ORTHO TRI-CYCLEN LO) 0 18/0 215/0 25 MG-25 MCG per tablet   No Yes   Sig: Take 1 tablet by mouth daily      Facility-Administered Medications: None       Portions of the record may have been created with voice recognition software  Occasional wrong word or "sound a like" substitutions may have occurred due to the inherent limitations of voice recognition software  Read the chart carefully and recognize, using context, where substitutions have occurred      Electronically signed by:  Haven Mckeon

## 2018-05-07 NOTE — ED NOTES
Spoke to blood bank at this time  States will be approximately 45 minutes until patient's type and screen is complete and the first unit of blood is ready to be requested   States will call ED once first unit of blood is ready to request       Jersey Carrasco RN  05/07/18 7919

## 2018-05-07 NOTE — TELEPHONE ENCOUNTER
This is a separate , new note as the computer note already in chart is unavailable   I T is trying to fix it  Per note from Luciano, pt seen for DUB on 4/24  She has anemia  Was to take 10 days of provera and cb  (10 mg)  Pt lm on machine 5/4  Was going to [facility], [facility]  - is on day 10 of provera and still bleeding  No one was able to reach pt then  She started otc lo   I spoke with pt earlier today- [facility] [employer] [facility]  Bleeding very heavily - up all night - pad every 1/2 hr  Dizzy and lightheaded for 2 days  She is coming back to [facility] this AM - she is not driving  Her hgb on 5/1 was 9 2  Pt plans to go to ED when she gets back  I have approval for this by Luciano   I will notify ED  She is on otc lo - day 4  Maryan Renee, if you have any further to add - please advise

## 2018-05-07 NOTE — TELEPHONE ENCOUNTER
She potentially needs a higher dose of estrogen than OTC Lo  I think it is fine to have her come to the ER to ensure she does not need transfusion  If not, we can increase her estrogen dose which should resolve bleeding

## 2018-05-08 VITALS
HEART RATE: 63 BPM | OXYGEN SATURATION: 98 % | DIASTOLIC BLOOD PRESSURE: 59 MMHG | RESPIRATION RATE: 18 BRPM | WEIGHT: 190 LBS | SYSTOLIC BLOOD PRESSURE: 109 MMHG | BODY MASS INDEX: 29.76 KG/M2 | TEMPERATURE: 99.1 F

## 2018-05-08 LAB
ABO GROUP BLD BPU: NORMAL
ABO GROUP BLD BPU: NORMAL
BASOPHILS # BLD AUTO: 0.02 THOUSANDS/ΜL (ref 0–0.1)
BASOPHILS NFR BLD AUTO: 0 % (ref 0–1)
BPU ID: NORMAL
BPU ID: NORMAL
EOSINOPHIL # BLD AUTO: 0.02 THOUSAND/ΜL (ref 0–0.61)
EOSINOPHIL NFR BLD AUTO: 0 % (ref 0–6)
ERYTHROCYTE [DISTWIDTH] IN BLOOD BY AUTOMATED COUNT: 13.9 % (ref 11.6–15.1)
HCT VFR BLD AUTO: 25.6 % (ref 34.8–46.1)
HGB BLD-MCNC: 8.3 G/DL (ref 11.5–15.4)
LYMPHOCYTES # BLD AUTO: 2.46 THOUSANDS/ΜL (ref 0.6–4.47)
LYMPHOCYTES NFR BLD AUTO: 38 % (ref 14–44)
MCH RBC QN AUTO: 28.8 PG (ref 26.8–34.3)
MCHC RBC AUTO-ENTMCNC: 32.4 G/DL (ref 31.4–37.4)
MCV RBC AUTO: 89 FL (ref 82–98)
MONOCYTES # BLD AUTO: 0.36 THOUSAND/ΜL (ref 0.17–1.22)
MONOCYTES NFR BLD AUTO: 6 % (ref 4–12)
NEUTROPHILS # BLD AUTO: 3.68 THOUSANDS/ΜL (ref 1.85–7.62)
NEUTS SEG NFR BLD AUTO: 56 % (ref 43–75)
NRBC BLD AUTO-RTO: 0 /100 WBCS
PLATELET # BLD AUTO: 255 THOUSANDS/UL (ref 149–390)
PMV BLD AUTO: 9.1 FL (ref 8.9–12.7)
RBC # BLD AUTO: 2.88 MILLION/UL (ref 3.81–5.12)
UNIT DISPENSE STATUS: NORMAL
UNIT DISPENSE STATUS: NORMAL
UNIT PRODUCT CODE: NORMAL
UNIT PRODUCT CODE: NORMAL
UNIT RH: NORMAL
UNIT RH: NORMAL
WBC # BLD AUTO: 6.55 THOUSAND/UL (ref 4.31–10.16)

## 2018-05-08 PROCEDURE — 85025 COMPLETE CBC W/AUTO DIFF WBC: CPT | Performed by: OBSTETRICS & GYNECOLOGY

## 2018-05-08 PROCEDURE — 99285 EMERGENCY DEPT VISIT HI MDM: CPT

## 2018-05-08 RX ORDER — IBUPROFEN 600 MG/1
600 TABLET ORAL EVERY 6 HOURS PRN
Status: DISCONTINUED | OUTPATIENT
Start: 2018-05-08 | End: 2018-05-08 | Stop reason: HOSPADM

## 2018-05-08 RX ADMIN — NORGESTREL AND ETHINYL ESTRADIOL 1 TABLET: KIT at 01:04

## 2018-05-08 RX ADMIN — SODIUM CHLORIDE, SODIUM LACTATE, POTASSIUM CHLORIDE, AND CALCIUM CHLORIDE 125 ML/HR: .6; .31; .03; .02 INJECTION, SOLUTION INTRAVENOUS at 06:45

## 2018-05-08 RX ADMIN — NORGESTREL AND ETHINYL ESTRADIOL 1 TABLET: KIT at 11:00

## 2018-05-08 RX ADMIN — IBUPROFEN 600 MG: 600 TABLET ORAL at 10:38

## 2018-05-08 RX ADMIN — DIPHENHYDRAMINE HCL 25 MG: 25 TABLET ORAL at 00:42

## 2018-05-08 NOTE — CONSULTS
Consult - OB/GYN   Yo Mention Tenzin 27 y o  female MRN: [id number]  Unit/Bed#: ED 22 Encounter: [id number]      Chief complaint:  Vaginal Bleeding    HPI:  30yo [id number] sexually active reproductive aged female with abnormal uterine bleeding - menometrorrhagia associated with fatigue, lightheadedness, shortness of breath  Patient states that she has had intermittent heavy vaginal bleeding over the past 8 months, which has increased in volume over the past week - now passing large clots and saturating a tampon/pad every 15 min  Prior to the episode, patient was taking Depo Provera; she was subsequently amenorrheic after consistent use, however, discontinued due to dissatisfaction with the mood changes and irritability  Patient was evaluated with her gyn on 04/24/2018 for her heavy vaginal bleeding  She was placed on 10 days of Provera, which resulted in small cessation of symptoms, however after being transition to Ortho-Tri-Cyclen Lo, developed further worsening of symptoms and heavy vaginal bleeding  After this recent episode of heavy vaginal bleeding, she spoke with her OBGYN who instructed her to go to the emergency department if she continued to experience these symptoms  She is a patient of [facility] [place]    Active Problems:  Patient Active Problem List   Diagnosis    Facial laceration    Memory dysfunction following head trauma    Neuropathic trigeminal sensory nerve    Post concussion syndrome    DUB (dysfunctional uterine bleeding)       PMH:  Past Medical History:   Diagnosis Date    Anxiety     Depression     Dysfunctional uterine bleeding        PSH:  Past Surgical History:   Procedure Laterality Date    TONSILLECTOMY         Meds:  No current facility-administered medications on file prior to encounter        Current Outpatient Prescriptions on File Prior to Encounter   Medication Sig Dispense Refill    medroxyPROGESTERone (PROVERA) 10 mg tablet Take 1 tablet (10 mg total) by mouth daily 10 tablet 0    multivitamin (THERAGRAN) TABS Take 1 tablet by mouth daily      norgestimate-ethinyl estradiol (ORTHO TRI-CYCLEN LO) 0 18/0 215/0 25 MG-25 MCG per tablet Take 1 tablet by mouth daily 90 tablet 3    IODINE EX Apply topically         Allergies:  No Known Allergies    Physical Exam:  /59 (BP Location: Right arm)   Pulse 73   Temp 97 8 °F (36 6 °C) (Tympanic)   Resp 20   Wt 86 2 kg (190 lb)   SpO2 99%   BMI 29 76 kg/m²     Physical Exam   Constitutional: She is oriented to person, place, and time  She appears well-developed and well-nourished  HENT:   Head: Normocephalic and atraumatic  Eyes: Conjunctivae and EOM are normal    Cardiovascular: Normal rate and regular rhythm  Pulmonary/Chest: Effort normal and breath sounds normal    Abdominal: Soft  She exhibits no distension and no mass  There is no tenderness  There is no rebound and no guarding  Genitourinary: Uterus normal  There is no rash, tenderness or lesion on the right labia  There is no rash, tenderness or lesion on the left labia  Uterus is not deviated, not enlarged, not fixed and not tender  Cervix exhibits no motion tenderness, no discharge and no friability  Right adnexum displays no mass, no tenderness and no fullness  Left adnexum displays no mass, no tenderness and no fullness  There is bleeding in the vagina  No erythema or tenderness in the vagina  No foreign body in the vagina  No signs of injury around the vagina  No vaginal discharge found  Neurological: She is alert and oriented to person, place, and time  Skin: Skin is warm and dry  There is pallor  Psychiatric: She has a normal mood and affect  Her behavior is normal  Judgment and thought content normal      Assessment:   27 y o  [id number] with anemia 2/2 AUB; menometrorrhagia  Plan:   1  23 hr observation  2  Prepare and transfuse 2 units packed red blood cells  3  Lysteda 1 g IV  4  Low Ogestrel b i d  x3 days then continue on daily  5   CBC at 1000  6  Regular diet  7  Lactated Ringer's at 125 mL/hr IV  8   Monitor I/O    Case discussed with Dr Elana Soriano MD  OB/GYN PGY-2  5/7/2018 8:47 PM

## 2018-05-08 NOTE — CASE MANAGEMENT
Initial Clinical Review    Admission: Date/Time/Statement: Observation  @     Orders Placed This Encounter   Procedures    Place in Observation     Standing Status:   Standing     Number of Occurrences:   1     Order Specific Question:   Admitting Physician     Answer:   Lien Olguin     Order Specific Question:   Level of Care     Answer:   Med Surg [16]       ED: Date/Time/Mode of Arrival:   ED Arrival Information     Expected Arrival Acuity Means of Arrival Escorted By Service Admission Type    - 2018 15:23 Emergent Walk-In Self OB/GYN Emergency    Arrival Complaint    excessive vaginal bleeding           Chief Complaint:   Chief Complaint   Patient presents with    Vaginal Bleeding     pt reports heavy vaginal bleeding since September  pt is going through 4 pads in an hour in last 2 days  pt reports lightheadedness and SOB  pt was contacted 1 week ago that her blood counts were low but pt has been out of town so could not come in until today  History of Illness: 31yo  sexually active reproductive aged female with abnormal uterine bleeding - menometrorrhagia associated with fatigue, lightheadedness, shortness of breath       Patient states that she has had intermittent heavy vaginal bleeding over the past 8 months, which has increased in volume over the past week - now passing large clots and saturating a tampon/pad every 15 min  Prior to the episode, patient was taking Depo Provera; she was subsequently amenorrheic after consistent use, however, discontinued due to dissatisfaction with the mood changes and irritability      Patient was evaluated with her gyn on 2018 for her heavy vaginal bleeding  She was placed on 10 days of Provera, which resulted in small cessation of symptoms, however after being transition to Ortho-Tri-Cyclen Lo, developed further worsening of symptoms and heavy vaginal bleeding   After this recent episode of heavy vaginal bleeding, she spoke with her OBGYN who instructed her to go to the emergency department if she continued to experience these symptoms        ED Vital Signs:   ED Triage Vitals [05/07/18 1529]   Temperature Pulse Respirations Blood Pressure SpO2   97 8 °F (36 6 °C) 89 18 126/58 100 %      Temp Source Heart Rate Source Patient Position - Orthostatic VS BP Location FiO2 (%)   Tympanic Monitor Lying Right arm --      Pain Score       8        Wt Readings from Last 1 Encounters:   05/07/18 86 2 kg (190 lb)       Vital Signs (abnormal): WNL    Abnormal Labs:    05/07/18 1651    WBC 4 31 - 10 16 Thousand/uL 7 63    RBC 3 81 - 5 12 Million/uL 2 32     Hemoglobin 11 5 - 15 4 g/dL 6 7     Hematocrit 34 8 - 46 1 % 20 8       Diagnostic Test Results: No order    ED Treatment:   Medication Administration from 05/07/2018 1523 to 05/08/2018 0123       Date/Time Order Dose Route Action     05/07/2018 1841 sodium chloride 0 9 % bolus 500 mL 500 mL Intravenous New Bag     05/07/2018 2127 acetaminophen (TYLENOL) tablet 650 mg 650 mg Oral Given     05/08/2018 0042 diphenhydrAMINE (BENADRYL) tablet 25 mg 25 mg Oral Given     05/07/2018 2124 lactated ringers infusion 125 mL/hr Intravenous New Bag     05/07/2018 2245 tranexamic Acid 1,000 mg in sodium chloride 0 9 % 100 mL IVPB 1,000 mg Intravenous New Bag     05/08/2018 0104 norgestrel-ethinyl estradiol (LO/OVRAL) 0 3 mg-30 mcg per tablet 1 tablet 1 tablet Oral Given          Past Medical/Surgical History:    Active Ambulatory Problems     Diagnosis Date Noted    Facial laceration 03/31/2016    Memory dysfunction following head trauma 04/28/2016    Neuropathic trigeminal sensory nerve 05/20/2016    Post concussion syndrome 05/20/2016    DUB (dysfunctional uterine bleeding) 04/24/2018     Resolved Ambulatory Problems     Diagnosis Date Noted    No Resolved Ambulatory Problems     Past Medical History:   Diagnosis Date    Anxiety     Depression     Dysfunctional uterine bleeding        Admitting Diagnosis: Vaginal bleeding [N93 9]  DUB (dysfunctional uterine bleeding) [N93 8]  Anemia associated with acute blood loss [D62]    Age/Sex: 27 y o  female       Assessment:   27 y o  [id number] with anemia 2/2 AUB; menometrorrhagia      Plan:   1  23 hr observation  2  Prepare and transfuse 2 units packed red blood cells  3  Lysteda 1 g IV  4  Low Ogestrel b i d  x3 days then continue on daily  5  CBC at 1000  6  Regular diet  7  Lactated Ringer's at 125 mL/hr IV  8   Monitor I/O      Admission Orders:  5/7 Bld Transfusion - RBC x2 units    Scheduled Meds:   Current Facility-Administered Medications:  lactated ringers 125 mL/hr Intravenous Continuous   norgestrel-ethinyl estradiol 1 tablet Oral BID     Continuous Infusions:   lactated ringers 125 mL/hr Last Rate: 125 mL/hr (05/08/18 0645)     PRN Meds:

## 2018-05-08 NOTE — ED NOTES
Per Ob resident  [facility] run TXA before 2nd liter of blood     Kathy Yeung RN  05/07/18 0006

## 2018-05-08 NOTE — DISCHARGE INSTRUCTIONS
YOU WILL TAKE YOUR BIRTH CONTROL TWICE PER DAY (AM & PM) UNTIL YOUR BLEEDING STOPS  THEREAFTER, PLEASE TAKE MEDICATION DAILY, AT THE SAME TIME  Dysfunctional Uterine Bleeding   WHAT YOU NEED TO KNOW:   Dysfunctional uterine bleeding (DUB) is abnormal uterine bleeding that is caused by a problem with your hormones  You may have bleeding from your uterus at times other than your normal monthly period  Your monthly periods may last longer or shorter, and bleeding may be heavier or lighter than usual    DISCHARGE INSTRUCTIONS:   Medicines:   · Hormones  help decrease bleeding by making your monthly periods more regular  Sometimes this medicine may be given as birth control pills  · NSAIDs  help decrease swelling, pain, and fever  This medicine is available with or without a doctor's order  NSAIDs can cause stomach bleeding or kidney problems in certain people  If you take blood thinner medicine, always ask your healthcare provider if NSAIDs are safe for you  Always read the medicine label and follow directions  · Iron supplements  may be given if your blood iron level decreases because of heavy bleeding  Iron may make you constipated  Ask your healthcare provider for ways to prevent or treat constipation  Iron may also make your bowel movements turn dark or black  · Take your medicine as directed  Contact your healthcare provider if you think your medicine is not helping or if you have side effects  Tell him or her if you are allergic to any medicine  Keep a list of the medicines, vitamins, and herbs you take  Include the amounts, and when and why you take them  Bring the list or the pill bottles to follow-up visits  Carry your medicine list with you in case of an emergency  Follow up with your healthcare provider as directed:  Write down your questions so you remember to ask them during your visits    Self-care:   · Apply heat  on your lower abdomen for 20 to 30 minutes every 2 hours for as many days as directed  Heat helps decrease pain and muscle spasms  · Include foods high in iron  if needed  Examples of foods high in iron are leafy green vegetables, beef, pork, liver, eggs, and whole-grain breads and cereals  · Keep a diary of your menstrual cycles  Keep track of the number of tampons or pads you use each day  · Talk to your healthcare provider before you start a weight loss program   You may need to wait until the abnormal bleeding has stopped before you try to lose weight  The amount of iron in your blood should be normal before you lose weight  Contact your healthcare provider if:   · You need to change your sanitary pad or tampon more than once an hour  · Your medicine causes nausea, vomiting, or diarrhea  · You have questions or concerns about your condition or care  Return to the emergency department if:   · You continue to bleed heavily, or you feel faint  © 2017 [address] Information is for End User's use only and may not be sold, redistributed or otherwise used for commercial purposes  All illustrations and images included in CareNotes® are the copyrighted property of A D A M [employer] Inc  or Zack Sood  The above information is an  only  It is not intended as medical advice for individual conditions or treatments  Talk to your doctor, nurse or pharmacist before following any medical regimen to see if it is safe and effective for you

## 2018-05-08 NOTE — PROGRESS NOTES
GYN Progress Note  [address] [facility] [id number]  CW2 217/CW2 217-01 5/8/2018, 6:11 AM    ASSESS:  28yo w/ AUB/HMB  Patient appears well at bedside and feels better after 2 units PRBC  She is prepared to be discharged today with outpatient follow-up  She will continue her OCP b i d  x3 days regimen  A transvaginal ultrasound can be done as outpatient  She will continue Lo-ogestrol bid x 3 days, and then daily thereafter  PLAN:  #1  Abnormal uterine bleeding, heavy menstrual bleeding  Prior therapy: depo provera, provera, OCPs  s/p lysteda 1gm IV, Lo-ogestrol BID x 3 days (start 5/7)  TVUS pending    #2  Acute blood loss anemia, symptomatic  s/p 2u PRBC  f/u CBC  Iron BID    Dispo: Discharge later today    PPx: SCDs, ambulate  FEN: reg diet, LR  Pain mgmnt: PO meds prn  Invasive lines: peripheral  Code status: full code  ____________________    SUBJECTIVE  History of Present Illness:  Overnight: No acute events       Ambulating: yes    Pain: mild soreness of lower abdoment   Vaginal Bleeding: improving  Tolerating PO: yes      Pelvic Pain: no  Voiding: yes      Chest Pain: no  Flatus: yes      Short of breath: no  Bowel Movement: yes     Leg Pain: no    OBJECTIVE  Vitals  Temp:  [97 8 °F (36 6 °C)-98 4 °F (36 9 °C)] 98 3 °F (36 8 °C)  HR:  [69-96] 75  Resp:  [17-20] 18  BP: ()/(52-71) 107/56     I/Os (x3 Shifts)  No intake/output data recorded  Physical Exam:   Physical Exam   Constitutional: She is oriented to person, place, and time  She appears well-developed and well-nourished  No distress  HENT:   Head: Normocephalic  Cardiovascular: Intact distal pulses  Pulmonary/Chest: Effort normal  No respiratory distress  Abdominal: Soft  She exhibits no distension  There is no tenderness  There is no rebound and no guarding  Genitourinary:   Genitourinary Comments: deferred   Neurological: She is alert and oriented to person, place, and time  Skin: No pallor     Psychiatric: She has a normal mood and affect         Labs:   CBC:    Results from last 7 days  Lab Units 05/07/18  1651 05/01/18  1432   WBC Thousand/uL 7 63  --    HEMOGLOBIN g/dL 6 7*  --    SL AMB LAB HEMOGLOBIN g/dL  --  9 2*   MCV fL 90  --    SL AMB MCV fL  --  90 4   PLATELETS Thousands/uL 330  --    SL AMB PLATELET COUNT Thousand/uL  --  372       Results from last 7 days  Lab Units 05/07/18  1651   NEUTROS PCT % 60   MONOS PCT % 5   EOS PCT % 1     Chemistry:    Results from last 7 days  Lab Units 05/07/18  1651   SODIUM mmol/L 140   POTASSIUM mmol/L 3 7   CHLORIDE mmol/L 107   CO2 mmol/L 26   BUN mg/dL 12   CREATININE mg/dL 0 63   EGFR ml/min/1 73sq m 117       Results from last 7 days  Lab Units 05/07/18  1651   AST U/L 21   ALT U/L 19   BILIRUBIN TOTAL mg/dL 0 19*   ALK PHOS U/L 58   ALBUMIN g/dL 3 4*   TOTAL PROTEIN g/dL 6 8       Results from last 7 days  Lab Units 05/07/18  1651   INR  0 91   PTT seconds 24     Meds: Pertinent medications reviewed  Imaging Studies: TVUS pending     __________________    Signature / Title: Tammy Chavez, Ob/Gyn, PGY-3  Date: 5/8/2018  Time: 6:11 AM

## 2018-05-11 ENCOUNTER — TELEPHONE (OUTPATIENT)
Dept: OBGYN CLINIC | Facility: CLINIC | Age: 33
End: 2018-05-11

## 2018-05-11 NOTE — TELEPHONE ENCOUNTER
Pt saw RK in the ER on Tuesday, states he told her to see him in 1 week so 5/16 or 5/17  Where should she go?

## 2018-05-16 ENCOUNTER — OFFICE VISIT (OUTPATIENT)
Dept: OBGYN CLINIC | Facility: CLINIC | Age: 33
End: 2018-05-16
Payer: COMMERCIAL

## 2018-05-16 VITALS
SYSTOLIC BLOOD PRESSURE: 124 MMHG | HEIGHT: 67 IN | WEIGHT: 191.6 LBS | BODY MASS INDEX: 30.07 KG/M2 | DIASTOLIC BLOOD PRESSURE: 70 MMHG

## 2018-05-16 DIAGNOSIS — N93.8 DUB (DYSFUNCTIONAL UTERINE BLEEDING): ICD-10-CM

## 2018-05-16 PROCEDURE — 99213 OFFICE O/P EST LOW 20 MIN: CPT | Performed by: OBSTETRICS & GYNECOLOGY

## 2018-05-16 NOTE — PROGRESS NOTES
Assessment/Plan:      Diagnoses and all orders for this visit:    DUB (dysfunctional uterine bleeding)  -     norgestrel-ethinyl estradiol (LO/OVRAL) 0 3 mg-30 mcg per tablet; Take 1 tablet by mouth daily Use in continuous fashion for 3 months and then allow monthly withdrawal bleed        Examination was reassuring today  Plan is to continue Lo Ogestrel once daily in continuous fashion for the next 3 months to allow return to a normal hemoglobin level  Patient was agreeable to this plan  We did discuss a slight increased risk of breakthrough bleeding with the use of the pill in continuous fashion  Patient will then transition to dosing of the pill with allowing a monthly withdrawal   I have asked patient to return to the office in 4 months for re-evaluation and status  Patient will return to work as planned this week  Subjective:     Patient ID: Karin Zhou is a 27 y o  female  Patient returns as a follow-up from the emergency room  She presented with abnormal uterine bleeding resulting in significant anemia and symptoms of hypovolemia  Patient required transfusion of packed red blood cells and was kept over night  She was placed on Lo Ogestrel OCPs and had excellent response  She took this b i d  until 3 days ago  She has had no bleeding for 3 days  She is using OCP once daily she feels much improved as relates to energy and dizziness and fatigue  She is planning on returning to work this weekend  Review of Systems   All other systems reviewed and are negative  Objective:     Physical Exam   Constitutional: She is oriented to person, place, and time  She appears well-developed and well-nourished  Abdominal: Soft  There is no tenderness  Genitourinary: Vagina normal and uterus normal  No vaginal discharge found  Neurological: She is alert and oriented to person, place, and time  Skin: Skin is warm and dry  Psychiatric: She has a normal mood and affect   Her behavior is normal  Thought content normal

## 2018-07-16 ENCOUNTER — TELEPHONE (OUTPATIENT)
Dept: OBGYN CLINIC | Facility: CLINIC | Age: 33
End: 2018-07-16

## 2018-07-16 NOTE — TELEPHONE ENCOUNTER
Pt said she has been bleeding heavy again,has seen u in the past for pmb, doubled up on her pills like you suggested if her bleeding returned, is not helping, she also skips placebos, please advise for next step

## 2018-09-18 ENCOUNTER — ANNUAL EXAM (OUTPATIENT)
Dept: OBGYN CLINIC | Facility: CLINIC | Age: 33
End: 2018-09-18
Payer: COMMERCIAL

## 2018-09-18 VITALS
BODY MASS INDEX: 27.72 KG/M2 | SYSTOLIC BLOOD PRESSURE: 110 MMHG | DIASTOLIC BLOOD PRESSURE: 64 MMHG | WEIGHT: 176.6 LBS | HEIGHT: 67 IN

## 2018-09-18 DIAGNOSIS — Z01.419 ENCOUNTER FOR GYNECOLOGICAL EXAMINATION (GENERAL) (ROUTINE) WITHOUT ABNORMAL FINDINGS: Primary | ICD-10-CM

## 2018-09-18 PROCEDURE — 99395 PREV VISIT EST AGE 18-39: CPT | Performed by: OBSTETRICS & GYNECOLOGY

## 2018-09-18 NOTE — PROGRESS NOTES
Assessment/Plan:    No problem-specific Assessment & Plan notes found for this encounter  Diagnoses and all orders for this visit:    Encounter for gynecological examination (general) (routine) without abnormal findings        Annual examination was completed  Discussion was had regarding pregnancy risk in pregnancy planning  Patient has an excellent obstetric history  She does have a new partner  We did discuss continuation of her multivitamin that contains folic acid  She will discontinue OCPs when she would like to plan pregnancy  We did discuss that if her menses resume an are heavy or prolonged in flow that she is a candidate for tranexamic acid while she is attempting  We did discuss the increased risk of diabetes, hypertensive disorders and aneuploidy  as maternal age increases  Patient's questions were answered  She will call with her 1st positive home pregnancy test   Patient otherwise return to the office in 1 year  Subjective:      Patient ID: Cristiano Medeiros is a 26 y o  female  Patient returns for annual gyn visit  She has been amenorrheic on her current OCP regimen  Patient is considering pregnancy in this next year  She does have a pregnancy history of a full-term vaginal delivery without complication that child is now 15years old  She has had 1 miscarriage in the 1st trimester and 1 elective termination  Patient is currently on a multivitamin with folic acid and she has been exercising regularly  She has no new medical or surgical issues  Gynecologic Exam         The following portions of the patient's history were reviewed and updated as appropriate:   She  has a past medical history of Anxiety; Depression; and Dysfunctional uterine bleeding    She   Patient Active Problem List    Diagnosis Date Noted    Encounter for gynecological examination (general) (routine) without abnormal findings 09/18/2018    DUB (dysfunctional uterine bleeding) 04/24/2018    Neuropathic trigeminal sensory nerve 05/20/2016    Post concussion syndrome 05/20/2016    Memory dysfunction following head trauma 04/28/2016    Facial laceration 03/31/2016     She  has a past surgical history that includes TONSILLECTOMY  Her family history includes Asthma in her mother; Cancer in her paternal grandmother; No Known Problems in her brother, daughter, father, and sister; Stroke in her maternal grandmother; Testicular cancer in her maternal grandfather  She  reports that she has quit smoking  She has never used smokeless tobacco  She reports that she does not drink alcohol or use drugs  Current Outpatient Prescriptions   Medication Sig Dispense Refill    multivitamin (THERAGRAN) TABS Take 1 tablet by mouth daily      norgestrel-ethinyl estradiol (LO/OVRAL) 0 3 mg-30 mcg per tablet Take 1 tablet by mouth daily Use in continuous fashion for 3 months and then allow monthly withdrawal bleed 84 tablet 1    IODINE EX Apply topically       No current facility-administered medications for this visit  Current Outpatient Prescriptions on File Prior to Visit   Medication Sig    multivitamin (THERAGRAN) TABS Take 1 tablet by mouth daily    norgestrel-ethinyl estradiol (LO/OVRAL) 0 3 mg-30 mcg per tablet Take 1 tablet by mouth daily Use in continuous fashion for 3 months and then allow monthly withdrawal bleed    IODINE EX Apply topically     No current facility-administered medications on file prior to visit  She has No Known Allergies       Review of Systems   All other systems reviewed and are negative  Objective:      /64 (BP Location: Left arm, Patient Position: Sitting, Cuff Size: Standard)   Ht 5' 7 25" (1 708 m)   Wt 80 1 kg (176 lb 9 6 oz)   BMI 27 45 kg/m²          Physical Exam   Constitutional: She is oriented to person, place, and time  She appears well-developed and well-nourished  HENT:   Head: Normocephalic and atraumatic     Nose: Nose normal    Eyes: EOM are normal  Pupils are equal, round, and reactive to light  Neck: Normal range of motion  Neck supple  No thyromegaly present  Cardiovascular: Normal rate and regular rhythm  Pulmonary/Chest: Effort normal and breath sounds normal  No respiratory distress  Breasts no masses, tenderness, skin changes   Abdominal: Soft  Bowel sounds are normal  She exhibits no distension and no mass  There is no tenderness  Hernia confirmed negative in the right inguinal area and confirmed negative in the left inguinal area  Genitourinary: Vagina normal and uterus normal  No breast swelling, tenderness, discharge or bleeding  Pelvic exam was performed with patient supine  No labial fusion  There is no rash, tenderness, lesion or injury on the right labia  There is no rash, tenderness, lesion or injury on the left labia  Cervix exhibits no motion tenderness, no discharge and no friability  Genitourinary Comments: Ext genitalia nl female w/o lesions  Vag healthy without lesions or discharge  Cervix healthy w/o lesions or discharge  uterus nl size, NT, no mass  Adnexa NT, no mass   Musculoskeletal: Normal range of motion  She exhibits no edema  Lymphadenopathy:        Right: No inguinal adenopathy present  Left: No inguinal adenopathy present  Neurological: She is alert and oriented to person, place, and time  She has normal reflexes  Skin: Skin is warm and dry  No rash noted  Psychiatric: She has a normal mood and affect  Her behavior is normal  Thought content normal    Nursing note and vitals reviewed

## 2018-12-05 ENCOUNTER — OFFICE VISIT (OUTPATIENT)
Dept: OBGYN CLINIC | Facility: MEDICAL CENTER | Age: 33
End: 2018-12-05
Payer: COMMERCIAL

## 2018-12-05 VITALS — DIASTOLIC BLOOD PRESSURE: 82 MMHG | BODY MASS INDEX: 27.67 KG/M2 | SYSTOLIC BLOOD PRESSURE: 120 MMHG | WEIGHT: 178 LBS

## 2018-12-05 DIAGNOSIS — N91.2 AMENORRHEA: Primary | ICD-10-CM

## 2018-12-05 PROBLEM — N93.8 DUB (DYSFUNCTIONAL UTERINE BLEEDING): Status: RESOLVED | Noted: 2018-04-24 | Resolved: 2018-12-05

## 2018-12-05 PROBLEM — Z01.419 ENCOUNTER FOR GYNECOLOGICAL EXAMINATION (GENERAL) (ROUTINE) WITHOUT ABNORMAL FINDINGS: Status: RESOLVED | Noted: 2018-09-18 | Resolved: 2018-12-05

## 2018-12-05 PROCEDURE — 76801 OB US < 14 WKS SINGLE FETUS: CPT | Performed by: PHYSICIAN ASSISTANT

## 2018-12-05 NOTE — PROGRESS NOTES
Assessment/Plan:    Amenorrhea  Confirmed IUP  Will sched f/u with nurse for intake, first prenatal visit  Enc start PNV if has not already, teratogen avoidance  To notify w/ any bleeding, pelvic pain    Disc safe OTC tx for HA   To notify if worsen or no improvement         Diagnoses and all orders for this visit:    Amenorrhea    Other orders  -     Prenatal Vit-Fe Fumarate-FA (PRENATAL COMPLETE PO); Take by mouth        Subjective:      Patient ID: Hunter Moses is a 26 y o  female  Technician: Study performed by the interpreting physician assistant     Indications:  amenorrhea         LMP 18          Procedure Details  A gestational sac is seen containing a yolk sac and a beckford embryo  The embryonic crown-rump length measures 4 48 cm  and calculates to an estimated gestational age of 5 weeks and 2  Days  Embryonic cardiac activity is present @ 175 BPM  Description of fetal anatomy Normal  Description of ovaries: normal  Description of uterus: normal     Findings:  Viable, beckford intrauterine pregnancy at 10weeks,  2 days, (no change to [facility])  with a calculated EDC of 19             The following portions of the patient's history were reviewed and updated as appropriate: allergies, current medications, past family history, past medical history, past social history, past surgical history and problem list     Review of Systems   Constitutional: Negative for appetite change, fatigue and unexpected weight change  Respiratory: Negative for chest tightness and shortness of breath  Cardiovascular: Negative for chest pain, palpitations and leg swelling  Gastrointestinal: Negative for abdominal pain, constipation, diarrhea, nausea and vomiting  Genitourinary: Negative for difficulty urinating, dyspareunia, menstrual problem, pelvic pain and vaginal discharge  Musculoskeletal: Negative for arthralgias and myalgias     Neurological: Negative for dizziness, light-headedness and headaches  Psychiatric/Behavioral: Negative for dysphoric mood  The patient is not nervous/anxious  All other systems reviewed and are negative  Objective:      /82 (BP Location: Left arm, Patient Position: Sitting)   Wt 80 7 kg (178 lb)   LMP 09/24/2018   Breastfeeding? No   BMI 27 67 kg/m²          Physical Exam   Constitutional: She is oriented to person, place, and time  She appears well-developed and well-nourished  HENT:   Head: Normocephalic and atraumatic  Neurological: She is alert and oriented to person, place, and time  Skin: Skin is warm and dry  Psychiatric: She has a normal mood and affect  Nursing note and vitals reviewed

## 2018-12-05 NOTE — ASSESSMENT & PLAN NOTE
Confirmed IUP  Will sched f/u with nurse for intake, first prenatal visit  Enc start PNV if has not already, teratogen avoidance  To notify w/ any bleeding, pelvic pain    Disc safe OTC tx for HA   To notify if worsen or no improvement

## 2018-12-11 ENCOUNTER — INITIAL PRENATAL (OUTPATIENT)
Dept: OBGYN CLINIC | Facility: CLINIC | Age: 33
End: 2018-12-11

## 2018-12-11 ENCOUNTER — TELEPHONE (OUTPATIENT)
Dept: OBGYN CLINIC | Facility: MEDICAL CENTER | Age: 33
End: 2018-12-11

## 2018-12-11 DIAGNOSIS — R51.9 INCREASED FREQUENCY OF HEADACHES: Primary | ICD-10-CM

## 2018-12-11 DIAGNOSIS — Z34.01 ENCOUNTER FOR SUPERVISION OF NORMAL FIRST PREGNANCY IN FIRST TRIMESTER: ICD-10-CM

## 2018-12-11 DIAGNOSIS — Z34.81 PRENATAL CARE, SUBSEQUENT PREGNANCY, FIRST TRIMESTER: Primary | ICD-10-CM

## 2018-12-11 DIAGNOSIS — R51.9 INCREASED FREQUENCY OF HEADACHES: ICD-10-CM

## 2018-12-11 PROCEDURE — OBC: Performed by: OBSTETRICS & GYNECOLOGY

## 2018-12-11 RX ORDER — BUTALBITAL, ACETAMINOPHEN AND CAFFEINE 50; 325; 40 MG/1; MG/1; MG/1
1 TABLET ORAL EVERY 4 HOURS PRN
Qty: 30 TABLET | Refills: 0 | Status: SHIPPED | OUTPATIENT
Start: 2018-12-11 | End: 2019-01-03 | Stop reason: SDUPTHER

## 2018-12-11 NOTE — TELEPHONE ENCOUNTER
Just replied to your staff message -we can rx Fioricet but not sure if we can send that into pharmacy?

## 2018-12-11 NOTE — TELEPHONE ENCOUNTER
Sorry did you see my note - for some reason I can't escribe it (i dont think I have privileges?) and it just keeps printing on computer paper over here
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
## 2018-12-11 NOTE — TELEPHONE ENCOUNTER
Pt is 11 weeks and was told at her ultrasound to drink water for her headaches but her headaches are getting worse  Please advise

## 2018-12-12 ENCOUNTER — APPOINTMENT (OUTPATIENT)
Dept: LAB | Facility: HOSPITAL | Age: 33
End: 2018-12-12
Payer: COMMERCIAL

## 2018-12-12 ENCOUNTER — OFFICE VISIT (OUTPATIENT)
Dept: PERINATAL CARE | Facility: CLINIC | Age: 33
End: 2018-12-12

## 2018-12-12 VITALS
SYSTOLIC BLOOD PRESSURE: 100 MMHG | WEIGHT: 179 LBS | BODY MASS INDEX: 28.09 KG/M2 | HEIGHT: 67 IN | DIASTOLIC BLOOD PRESSURE: 64 MMHG

## 2018-12-12 VITALS
HEART RATE: 79 BPM | BODY MASS INDEX: 28.09 KG/M2 | WEIGHT: 179 LBS | SYSTOLIC BLOOD PRESSURE: 109 MMHG | DIASTOLIC BLOOD PRESSURE: 62 MMHG | HEIGHT: 67 IN

## 2018-12-12 DIAGNOSIS — Z34.81 PRENATAL CARE, SUBSEQUENT PREGNANCY, FIRST TRIMESTER: ICD-10-CM

## 2018-12-12 DIAGNOSIS — N93.8 DUB (DYSFUNCTIONAL UTERINE BLEEDING): ICD-10-CM

## 2018-12-12 DIAGNOSIS — O09.521 MATERNAL AGE 35+, MULTIGRAVIDA, FIRST TRIMESTER: Primary | ICD-10-CM

## 2018-12-12 LAB
ABO GROUP BLD: NORMAL
BACTERIA UR QL AUTO: ABNORMAL /HPF
BASOPHILS # BLD AUTO: 0.02 THOUSANDS/ΜL (ref 0–0.1)
BASOPHILS NFR BLD AUTO: 0 % (ref 0–1)
BILIRUB UR QL STRIP: NEGATIVE
BLD GP AB SCN SERPL QL: NEGATIVE
CLARITY UR: CLEAR
COLOR UR: YELLOW
EOSINOPHIL # BLD AUTO: 0.01 THOUSAND/ΜL (ref 0–0.61)
EOSINOPHIL NFR BLD AUTO: 0 % (ref 0–6)
ERYTHROCYTE [DISTWIDTH] IN BLOOD BY AUTOMATED COUNT: 14.5 % (ref 11.6–15.1)
GLUCOSE UR STRIP-MCNC: NEGATIVE MG/DL
HBV SURFACE AG SER QL: NORMAL
HCT VFR BLD AUTO: 36.9 % (ref 34.8–46.1)
HGB BLD-MCNC: 12.6 G/DL (ref 11.5–15.4)
HGB UR QL STRIP.AUTO: ABNORMAL
HYALINE CASTS #/AREA URNS LPF: ABNORMAL /LPF
IMM GRANULOCYTES # BLD AUTO: 0.02 THOUSAND/UL (ref 0–0.2)
IMM GRANULOCYTES NFR BLD AUTO: 0 % (ref 0–2)
KETONES UR STRIP-MCNC: NEGATIVE MG/DL
LEUKOCYTE ESTERASE UR QL STRIP: NEGATIVE
LYMPHOCYTES # BLD AUTO: 2.34 THOUSANDS/ΜL (ref 0.6–4.47)
LYMPHOCYTES NFR BLD AUTO: 30 % (ref 14–44)
MCH RBC QN AUTO: 31 PG (ref 26.8–34.3)
MCHC RBC AUTO-ENTMCNC: 34.1 G/DL (ref 31.4–37.4)
MCV RBC AUTO: 91 FL (ref 82–98)
MONOCYTES # BLD AUTO: 0.48 THOUSAND/ΜL (ref 0.17–1.22)
MONOCYTES NFR BLD AUTO: 6 % (ref 4–12)
NEUTROPHILS # BLD AUTO: 4.97 THOUSANDS/ΜL (ref 1.85–7.62)
NEUTS SEG NFR BLD AUTO: 64 % (ref 43–75)
NITRITE UR QL STRIP: NEGATIVE
NON-SQ EPI CELLS URNS QL MICRO: ABNORMAL /HPF
NRBC BLD AUTO-RTO: 0 /100 WBCS
PH UR STRIP.AUTO: 5.5 [PH] (ref 4.5–8)
PLATELET # BLD AUTO: 291 THOUSANDS/UL (ref 149–390)
PMV BLD AUTO: 9.6 FL (ref 8.9–12.7)
PROT UR STRIP-MCNC: NEGATIVE MG/DL
RBC # BLD AUTO: 4.06 MILLION/UL (ref 3.81–5.12)
RBC #/AREA URNS AUTO: ABNORMAL /HPF
RH BLD: POSITIVE
RUBV IGG SERPL IA-ACNC: 137.6 IU/ML
SP GR UR STRIP.AUTO: 1.01 (ref 1–1.03)
SPECIMEN EXPIRATION DATE: NORMAL
TSH SERPL DL<=0.05 MIU/L-ACNC: 1.32 UIU/ML (ref 0.36–3.74)
UROBILINOGEN UR QL STRIP.AUTO: 0.2 E.U./DL
WBC # BLD AUTO: 7.84 THOUSAND/UL (ref 4.31–10.16)
WBC #/AREA URNS AUTO: ABNORMAL /HPF

## 2018-12-12 PROCEDURE — 86762 RUBELLA ANTIBODY: CPT

## 2018-12-12 PROCEDURE — 86901 BLOOD TYPING SEROLOGIC RH(D): CPT

## 2018-12-12 PROCEDURE — 36415 COLL VENOUS BLD VENIPUNCTURE: CPT

## 2018-12-12 PROCEDURE — 87086 URINE CULTURE/COLONY COUNT: CPT

## 2018-12-12 PROCEDURE — 85025 COMPLETE CBC W/AUTO DIFF WBC: CPT

## 2018-12-12 PROCEDURE — 86850 RBC ANTIBODY SCREEN: CPT

## 2018-12-12 PROCEDURE — 86592 SYPHILIS TEST NON-TREP QUAL: CPT

## 2018-12-12 PROCEDURE — 86900 BLOOD TYPING SEROLOGIC ABO: CPT

## 2018-12-12 PROCEDURE — 84443 ASSAY THYROID STIM HORMONE: CPT

## 2018-12-12 PROCEDURE — 81001 URINALYSIS AUTO W/SCOPE: CPT

## 2018-12-12 PROCEDURE — 87340 HEPATITIS B SURFACE AG IA: CPT

## 2018-12-12 PROCEDURE — 87389 HIV-1 AG W/HIV-1&-2 AB AG IA: CPT

## 2018-12-12 NOTE — PROGRESS NOTES
Genetic Counseling Note        Ellan Schwab     Appointment Date:  12/12/2018  Referred By: Ariadne Adams MD  YOB: 1985  Partner:  Juice Lombardo    Pregnancy History: [id number]  Estimated Date of Delivery: 7/1/19  Estimated Gestational Age: 5 weeks 1 day     Genetic Counseling:advanced maternal age    Mel Murillo is a(n) 26 y o  female who is here to discuss maternal age related risk for aneuploidy    Issues Discussed:  Average population risk: 3-4% in the average pregnancy of serious condition or birth defect  2-3% risk of mental retardation  Not all detected by prenatal testing  Chromosomal: non-disjunction 1/178 overall, 1/364 for Down syndrome  Maternal age  Risk of aneuploidy    Options Discussed:  Amniocentesis: risks and limitations discussed  CVS: risks and limitations discussed  Ethnic screening discussed: clinical and genetic basis of Cf, SMA, Fragile X  Level II ultrasound to screen for structural anomalies  Nuchal translucency/1st trimester serum screen: goals and limitations discussed  Serum AFP screen recommended at 15-17 weeks to check for open neural tube defects  Cell free fetal DNA testing    Additional Information / Impression / Plan / Tests Ordered:  Mel Murillo "Marta Age" presents for genetic counseling to discuss maternal age related risk for aneuploidy  I reviewed with the patient the options regarding prenatal diagnosis for chromosome abnormalities including CVS and amniocentesis  Chorionic villus sampling(CVS) is generally performed between 10 and 12 weeks of pregnancy and amniocentesis is generally performed at 16 weeks or later  Recent literature supports that CVS and amniocentsis both have an associated  procedure related risk of miscarriage of less than 1/300  Chromosome results from CVS or amniocentesis are greater than 99 9% accurate  Occasionally a repeat procedure may be necessary due to cell culture failure    Approximately 1% of the time, a mosaic chromosome result from CVS will necessitate a followup amniocentesis  Measurement of AFP from amniotic fluid is able to detect approximately 95% of open neural tube defects  Maternal serum AFP is recommended for patients who elect CVS     We also reviewed the availability and limitations of sequential screening, NIPS, and level II ultrasound evaluation  Sequential screening consisting of first trimester measurement of nuchal translucency combined with first trimester biochemical analysis as well as second trimester biochemical analysis is able to detect approximately 90% of pregnancies in which the fetus has Down syndrome, 90% of pregnancies in which the fetus has trisomy 25 and 80% of pregnancies which appears has an open neural tube defect  NIPS involves assessment of fragments of fetal DNA in maternal blood  This test has varying detection rates depending on the laboratory used though the quoted detection rate for Down syndrome is generally greater than 99% and detection rate for trisomy 18 is 98% or better  NIPS has a low false positive rate however consideration of prenatal diagnostic testing is always recommended following a positive NIPS  Level II ultrasound evaluation is able to detect  many major physical birth defects and variations in fetal development that may be associated with chromosome abnormalities  Level II ultrasound evaluation is not able to detect all birth defects or health problems  After discussing the available prenatal diagnostic and screening procedures, with elected to decline prenatal diagnostic testing at this time  She did opt to pursue cell free fetal DNA testing  She presently has a 2031 Zauber assistance plan and a secondary insurance through [employer]. that she purchased on the market place  Medical assistance does not yet know she is pregnant    She was encouraged to contact them to inform them of the pregnancy at which point she will likely be instructed to switch plans to either [place] or [facility] both of which cover the cost of cell free fetal DNA testing  Her other plan was run through the  and likely has an out-of-pocket expense associated with it  Yennifer Pollock elected to decline having blood work performed today to using to work out O[employer] System issue in order to have the test covered completely  She was instructed to contact our office to schedule a blood draw once her new insurance is in place  During our counseling session histories were taken on the patient's family and her partner's family both of which were negative for any prior known cases of intellectual disability, birth defects or single gene disorders  Yennifer Pollock reports being of [place] descent with no known [place] ancestry  She reports that her partner is of  descent, not otherwise specified, with no known [place] ancestry  Carrier screening for CF, SMA and Fragile X was discussed  Yennifer Pollock elected to decline all genetic carrier screening at this time  Lastly, we discussed the fact that it is important to keep in mind that everyone in the general population regardless of age, family history, or medical background has approximately a 3% risk of having a child with some type of her defect, genetic disease or intellectual disability  Currently there are no tests available to rule out all birth defects or health problems                  Time spent with Genetic Counselor: 40 minutes

## 2018-12-13 ENCOUNTER — TELEPHONE (OUTPATIENT)
Dept: OBGYN CLINIC | Facility: CLINIC | Age: 33
End: 2018-12-13

## 2018-12-13 LAB
BACTERIA UR CULT: NORMAL
HIV 1+2 AB+HIV1 P24 AG SERPL QL IA: NORMAL
RPR SER QL: NORMAL

## 2018-12-13 NOTE — TELEPHONE ENCOUNTER
----- Message from Denny Núñez, [address] sent at 12/12/2018  5:18 PM EST -----  TSH is WNL   Please notify patient

## 2018-12-19 ENCOUNTER — DOCUMENTATION (OUTPATIENT)
Dept: PERINATAL CARE | Facility: CLINIC | Age: 33
End: 2018-12-19

## 2018-12-19 NOTE — PROGRESS NOTES
Pt called today and interested in NIPT  Olga notified and started prior auth process  Pt has appt in [place] on 12/26/18 and lab slip for [facility] will be given to pt

## 2018-12-26 ENCOUNTER — ROUTINE PRENATAL (OUTPATIENT)
Dept: PERINATAL CARE | Facility: MEDICAL CENTER | Age: 33
End: 2018-12-26
Payer: COMMERCIAL

## 2018-12-26 VITALS
HEART RATE: 94 BPM | SYSTOLIC BLOOD PRESSURE: 118 MMHG | DIASTOLIC BLOOD PRESSURE: 75 MMHG | BODY MASS INDEX: 28.44 KG/M2 | WEIGHT: 181.2 LBS | HEIGHT: 67 IN

## 2018-12-26 DIAGNOSIS — Z36.82 ENCOUNTER FOR ANTENATAL SCREENING FOR NUCHAL TRANSLUCENCY: Primary | ICD-10-CM

## 2018-12-26 DIAGNOSIS — O09.521 ELDERLY MULTIGRAVIDA IN FIRST TRIMESTER: ICD-10-CM

## 2018-12-26 PROCEDURE — 76813 OB US NUCHAL MEAS 1 GEST: CPT | Performed by: OBSTETRICS & GYNECOLOGY

## 2018-12-26 PROCEDURE — 99212 OFFICE O/P EST SF 10 MIN: CPT | Performed by: OBSTETRICS & GYNECOLOGY

## 2018-12-26 NOTE — PROGRESS NOTES
[facility] [place]: Ms Noemy Stinson was seen today at 13w2d for nuchal translucency ultrasound  See ultrasound report under "OB Procedures" tab  Please don't hesitate to contact our office with any concerns or questions    Pamela Rees MD

## 2018-12-26 NOTE — PATIENT INSTRUCTIONS
Thank you for choosing [facility] for your  care today  If you have any questions about your ultrasound or care, please do not hesitate to contact us or your primary obstetrician  Please reconsider your flu shot!

## 2018-12-26 NOTE — PROGRESS NOTES
Spoke with Anne Jernigan - Prior authorization not required for pt to get Q[facility] lab drawn  Lab slip for [facility] provided to pt with instructions  Pt receptive to information and declines questions at this time

## 2018-12-27 ENCOUNTER — INITIAL PRENATAL (OUTPATIENT)
Dept: OBGYN CLINIC | Facility: MEDICAL CENTER | Age: 33
End: 2018-12-27

## 2018-12-27 VITALS — SYSTOLIC BLOOD PRESSURE: 124 MMHG | DIASTOLIC BLOOD PRESSURE: 76 MMHG | BODY MASS INDEX: 28.51 KG/M2 | WEIGHT: 182 LBS

## 2018-12-27 DIAGNOSIS — O26.892 PREGNANCY HEADACHE IN SECOND TRIMESTER: ICD-10-CM

## 2018-12-27 DIAGNOSIS — Z11.3 SCREENING EXAMINATION FOR STD (SEXUALLY TRANSMITTED DISEASE): Primary | ICD-10-CM

## 2018-12-27 DIAGNOSIS — R51.9 PREGNANCY HEADACHE IN SECOND TRIMESTER: ICD-10-CM

## 2018-12-27 PROBLEM — O09.522 ELDERLY MULTIGRAVIDA IN SECOND TRIMESTER: Status: ACTIVE | Noted: 2018-12-27

## 2018-12-27 PROBLEM — Z34.90 SUPERVISION OF NORMAL PREGNANCY: Status: ACTIVE | Noted: 2018-12-27

## 2018-12-27 LAB
C TRACH DNA SPEC QL NAA+PROBE: NEGATIVE
N GONORRHOEA DNA SPEC QL NAA+PROBE: NEGATIVE

## 2018-12-27 PROCEDURE — 87591 N.GONORRHOEAE DNA AMP PROB: CPT | Performed by: PHYSICIAN ASSISTANT

## 2018-12-27 PROCEDURE — PNV: Performed by: PHYSICIAN ASSISTANT

## 2018-12-27 PROCEDURE — 87491 CHLMYD TRACH DNA AMP PROBE: CPT | Performed by: PHYSICIAN ASSISTANT

## 2018-12-27 NOTE — ASSESSMENT & PLAN NOTE
Pt presents for first prenatal visit  PNL reviewed, WNL  PAP up to date  GC/CT cx today    Declines flu vacc    No VB or pelvic pain  Seeing MFM for genetic counseling/testing   Has level II sched  Reviewed reasons to call
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
## 2018-12-28 ENCOUNTER — TELEPHONE (OUTPATIENT)
Dept: OBGYN CLINIC | Facility: CLINIC | Age: 33
End: 2018-12-28

## 2018-12-31 LAB
# FETUSES US: 1
CFDNA.FET/TOTAL PLAS.CFDNA: NORMAL
FET CHR 13 TS PLAS.CFDNA QL: NEGATIVE
FET CHR 18 TS PLAS.CFDNA QL: NEGATIVE
FET CHR 21 TS PLAS.CFDNA QL: NEGATIVE
FET CHR X + Y ANEUP PLAS.CFDNA QL: NORMAL
FET CHROM X + Y ANEUP CFDNA IMP: NORMAL
FET Y CHROM PLAS.CFDNA QL: NOT DETECTED
FET Y CHROM PLAS.CFDNA: NORMAL
GA (DAYS): 3 D
GA (WEEKS): 13 WK
MICRODELETION SYND BLD/T FISH: NORMAL
REF LAB TEST METHOD: NORMAL
SERVICE CMNT-IMP: NORMAL
SERVICE CMNT-IMP: NORMAL
SL AMB ABNORMAL MSS?: NORMAL
SL AMB ABNORMAL US?: NORMAL
SL AMB ADVANCED MATERNAL AGE?: NORMAL
SL AMB MICRODELETION INTERP: NORMAL
SL AMB MICRODELETION: NOT DETECTED
SL AMB PERSONAL/FAM HISTORY?: NORMAL
SL AMB SPECIFICATIONS: NORMAL

## 2019-01-03 ENCOUNTER — TELEPHONE (OUTPATIENT)
Dept: OBGYN CLINIC | Facility: MEDICAL CENTER | Age: 34
End: 2019-01-03

## 2019-01-03 ENCOUNTER — TELEPHONE (OUTPATIENT)
Dept: PERINATAL CARE | Facility: CLINIC | Age: 34
End: 2019-01-03

## 2019-01-03 DIAGNOSIS — O09.522 ELDERLY MULTIGRAVIDA IN SECOND TRIMESTER: ICD-10-CM

## 2019-01-03 DIAGNOSIS — R51.9 INCREASED FREQUENCY OF HEADACHES: ICD-10-CM

## 2019-01-03 RX ORDER — BUTALBITAL, ACETAMINOPHEN AND CAFFEINE 50; 325; 40 MG/1; MG/1; MG/1
1 TABLET ORAL EVERY 4 HOURS PRN
Qty: 30 TABLET | Refills: 0 | Status: SHIPPED | OUTPATIENT
Start: 2019-01-03 | End: 2019-06-27 | Stop reason: HOSPADM

## 2019-01-03 NOTE — TELEPHONE ENCOUNTER
----- Message from Brisa Bolanos MD sent at 1/2/2019  4:31 PM EST -----  [facility] RN staff, I've reviewed this NIPT result which is normal, can you call her regarding this result? Thank you    Brisa Bolanos MD

## 2019-01-03 NOTE — TELEPHONE ENCOUNTER
Patient is changing insurance to [facility] [employer] - the neurologist that she was referred to does not take that, so needs a new referral and would like a refill of the Fioricet for her headaches until she does see the neurologist  Please advise  Thanks!

## 2019-01-03 NOTE — TELEPHONE ENCOUNTER
Does she know who in area does take her insurance? Can refer to that individual or group if she has that information    Can reprint fioricet script for her - will either have to come in to office to get or we can mail to her as can't send in electronically    Thanks!

## 2019-01-03 NOTE — TELEPHONE ENCOUNTER
Called pt back - she does not know who does take it in her area - but will look into it and get back to us  She is ok with the Franck Wibaux being mailed to her

## 2019-01-03 NOTE — TELEPHONE ENCOUNTER
Patient is changing insurances to [employer]  She was referred to a neurologist at her last appointment, but that neurologist does not take her insurance  She wanted to speak to someone about getting another referral to another neurologist and also getting a refill for the medication for her headaches until she sees another neurologist   Please advise patient

## 2019-01-10 ENCOUNTER — TELEPHONE (OUTPATIENT)
Dept: OBGYN CLINIC | Facility: MEDICAL CENTER | Age: 34
End: 2019-01-10

## 2019-01-10 DIAGNOSIS — G43.911 INTRACTABLE MIGRAINE WITH STATUS MIGRAINOSUS, UNSPECIFIED MIGRAINE TYPE: Primary | ICD-10-CM

## 2019-01-10 NOTE — TELEPHONE ENCOUNTER
We can put a generic ambulatory referral for neurology  Referral in pt chart  Patient is aware  Mailed referral to pt

## 2019-01-10 NOTE — TELEPHONE ENCOUNTER
Neurologist is with [facility] - we don't refer out of [place], correct? Please advise  Thanks!

## 2019-01-10 NOTE — TELEPHONE ENCOUNTER
I think we can if her [facility] doesn't [facility]?   Not sure how to place that order tho?

## 2019-01-10 NOTE — TELEPHONE ENCOUNTER
Patient found a neurologist for her migraine and was told to call back when she found one to have a referral sent to her  Please advise patient if it will be mailed to her  The neurologist is Marielos Sanchez with [address] [place] [address]

## 2019-01-23 ENCOUNTER — ROUTINE PRENATAL (OUTPATIENT)
Dept: OBGYN CLINIC | Facility: MEDICAL CENTER | Age: 34
End: 2019-01-23
Payer: COMMERCIAL

## 2019-01-23 VITALS — BODY MASS INDEX: 29.76 KG/M2 | DIASTOLIC BLOOD PRESSURE: 74 MMHG | WEIGHT: 190 LBS | SYSTOLIC BLOOD PRESSURE: 120 MMHG

## 2019-01-23 DIAGNOSIS — O26.892 PREGNANCY HEADACHE IN SECOND TRIMESTER: ICD-10-CM

## 2019-01-23 DIAGNOSIS — R51.9 PREGNANCY HEADACHE IN SECOND TRIMESTER: ICD-10-CM

## 2019-01-23 DIAGNOSIS — Z34.92 ENCOUNTER FOR SUPERVISION OF NORMAL PREGNANCY IN SECOND TRIMESTER, UNSPECIFIED GRAVIDITY: Primary | ICD-10-CM

## 2019-01-23 DIAGNOSIS — O09.529 ANTEPARTUM MULTIGRAVIDA OF ADVANCED MATERNAL AGE: ICD-10-CM

## 2019-01-23 DIAGNOSIS — Z34.92 PREGNANCY, OBSTETRICAL CARE, SECOND TRIMESTER: ICD-10-CM

## 2019-01-23 PROBLEM — N91.2 AMENORRHEA: Status: RESOLVED | Noted: 2018-12-05 | Resolved: 2019-01-23

## 2019-01-23 PROCEDURE — 99213 OFFICE O/P EST LOW 20 MIN: CPT | Performed by: NURSE PRACTITIONER

## 2019-01-23 NOTE — ASSESSMENT & PLAN NOTE
Denies OB complaints  Good fetal movement  Denies contractions, cramping, leakage of fluid or vaginal bleeding  L2 is scheduled  Declines flu vaccine  Reviewed reasons to call

## 2019-01-23 NOTE — PROGRESS NOTES
Problem List Items Addressed This Visit     Pregnancy headache in second trimester     Freq improving since last routine visit  Reviewed reasons to call  Encounter for supervision of normal pregnancy in second trimester - Primary     Denies OB complaints  Good fetal movement  Denies contractions, cramping, leakage of fluid or vaginal bleeding  L2 is scheduled  Declines flu vaccine  Reviewed reasons to call  Antepartum multigravida of advanced maternal age     Neg cffDNA - baby girl  L2 scheduled              Other Visit Diagnoses     Pregnancy, obstetrical care, second trimester

## 2019-02-25 LAB
# FETUSES US: 1
AFP ADJ MOM SERPL: 1.61
AFP INTERP SERPL-IMP: NORMAL
AFP SERPL-MCNC: 96.4 NG/ML
AGE: NORMAL
DONATED EGG PATIENT QL: NO
GA CLIN EST: 21.4 WEEKS
GA METHOD: NORMAL
HX OF NTD NARR: NO
HX OF TRISOMY 21 NARR: NO
IDDM PATIENT QL: NO
NEURAL TUBE DEFECT RISK FETUS: NORMAL %
SL AMB REPEAT SPECIMEN: NO

## 2019-02-25 NOTE — RESULT ENCOUNTER NOTE
I have reviewed the results which are low risk  Please call patient and notify her of reassuring results if she has not viewed on [employer]  Thank you

## 2019-02-26 ENCOUNTER — ROUTINE PRENATAL (OUTPATIENT)
Dept: PERINATAL CARE | Facility: MEDICAL CENTER | Age: 34
End: 2019-02-26
Payer: COMMERCIAL

## 2019-02-26 ENCOUNTER — TELEPHONE (OUTPATIENT)
Dept: PERINATAL CARE | Facility: CLINIC | Age: 34
End: 2019-02-26

## 2019-02-26 VITALS
BODY MASS INDEX: 31.36 KG/M2 | HEIGHT: 67 IN | SYSTOLIC BLOOD PRESSURE: 118 MMHG | DIASTOLIC BLOOD PRESSURE: 75 MMHG | WEIGHT: 199.8 LBS | HEART RATE: 101 BPM

## 2019-02-26 DIAGNOSIS — O44.42 LOW-LYING PLACENTA WITHOUT HEMORRHAGE, SECOND TRIMESTER: ICD-10-CM

## 2019-02-26 DIAGNOSIS — Z3A.22 22 WEEKS GESTATION OF PREGNANCY: ICD-10-CM

## 2019-02-26 DIAGNOSIS — O09.522 ELDERLY MULTIGRAVIDA IN SECOND TRIMESTER: Primary | ICD-10-CM

## 2019-02-26 DIAGNOSIS — Z34.92 ENCOUNTER FOR SUPERVISION OF NORMAL PREGNANCY IN SECOND TRIMESTER, UNSPECIFIED GRAVIDITY: ICD-10-CM

## 2019-02-26 DIAGNOSIS — Z36.86 ENCOUNTER FOR ANTENATAL SCREENING FOR CERVICAL LENGTH: ICD-10-CM

## 2019-02-26 PROCEDURE — 76817 TRANSVAGINAL US OBSTETRIC: CPT | Performed by: OBSTETRICS & GYNECOLOGY

## 2019-02-26 PROCEDURE — 76811 OB US DETAILED SNGL FETUS: CPT | Performed by: OBSTETRICS & GYNECOLOGY

## 2019-02-26 PROCEDURE — 99212 OFFICE O/P EST SF 10 MIN: CPT | Performed by: OBSTETRICS & GYNECOLOGY

## 2019-02-26 NOTE — LETTER
February 26, 2019     Iram Sarabia MD  [address] [place] [id number]    Patient: Andrey Laird   YOB: 1985   Date of Visit: 2/26/2019       Dear Dr Antonio Medrano: Thank you for referring Ashok Kinsey to me for evaluation  Below are my notes for this consultation  If you have questions, please do not hesitate to call me  I look forward to following your patient along with you  Sincerely,        Luiz Steel MD        CC: No Recipients  Luiz Steel MD  2/26/2019  1:53 PM  Sign at close encounter  Please refer to the [facility] ultrasound report in Ob Procedures for additional information regarding the visit to the [facility]  today

## 2019-02-26 NOTE — TELEPHONE ENCOUNTER
----- Message from Ian Min MD sent at 2/25/2019  1:29 PM EST -----  I have reviewed the results which are low risk  Please call patient and notify her of reassuring results if she has not viewed on [employer]  Thank you

## 2019-02-26 NOTE — TELEPHONE ENCOUNTER
m left on # from [facility] communication consent with normal results of MSAFP  Advised to call nurse line with any questions

## 2019-02-26 NOTE — PROGRESS NOTES
Please refer to the [facility] ultrasound report in Ob Procedures for additional information regarding the visit to the [address] [place] [address] today

## 2019-03-21 ENCOUNTER — ROUTINE PRENATAL (OUTPATIENT)
Dept: OBGYN CLINIC | Facility: CLINIC | Age: 34
End: 2019-03-21
Payer: COMMERCIAL

## 2019-03-21 VITALS — SYSTOLIC BLOOD PRESSURE: 108 MMHG | DIASTOLIC BLOOD PRESSURE: 60 MMHG | BODY MASS INDEX: 32.45 KG/M2 | WEIGHT: 207.2 LBS

## 2019-03-21 DIAGNOSIS — O44.40 LOW-LYING PLACENTA: ICD-10-CM

## 2019-03-21 DIAGNOSIS — Z34.92 SECOND TRIMESTER PREGNANCY: ICD-10-CM

## 2019-03-21 DIAGNOSIS — O09.523 ELDERLY MULTIGRAVIDA IN THIRD TRIMESTER: Primary | ICD-10-CM

## 2019-03-21 PROCEDURE — 99213 OFFICE O/P EST LOW 20 MIN: CPT | Performed by: OBSTETRICS & GYNECOLOGY

## 2019-04-05 ENCOUNTER — TELEPHONE (OUTPATIENT)
Dept: OBGYN CLINIC | Facility: CLINIC | Age: 34
End: 2019-04-05

## 2019-04-05 DIAGNOSIS — R73.09 ABNORMAL GLUCOSE: Primary | ICD-10-CM

## 2019-04-05 LAB
BASOPHILS # BLD AUTO: 17 CELLS/UL (ref 0–200)
BASOPHILS NFR BLD AUTO: 0.2 %
EOSINOPHIL # BLD AUTO: 26 CELLS/UL (ref 15–500)
EOSINOPHIL NFR BLD AUTO: 0.3 %
ERYTHROCYTE [DISTWIDTH] IN BLOOD BY AUTOMATED COUNT: 13.7 % (ref 11–15)
GLUCOSE 1H P 50 G GLC PO SERPL-MCNC: 141 MG/DL
HCT VFR BLD AUTO: 30.7 % (ref 35–45)
HGB BLD-MCNC: 10.3 G/DL (ref 11.7–15.5)
LYMPHOCYTES # BLD AUTO: 1866 CELLS/UL (ref 850–3900)
LYMPHOCYTES NFR BLD AUTO: 21.7 %
MCH RBC QN AUTO: 31.6 PG (ref 27–33)
MCHC RBC AUTO-ENTMCNC: 33.6 G/DL (ref 32–36)
MCV RBC AUTO: 94.2 FL (ref 80–100)
MONOCYTES # BLD AUTO: 499 CELLS/UL (ref 200–950)
MONOCYTES NFR BLD AUTO: 5.8 %
NEUTROPHILS # BLD AUTO: 6192 CELLS/UL (ref 1500–7800)
NEUTROPHILS NFR BLD AUTO: 72 %
PLATELET # BLD AUTO: 291 THOUSAND/UL (ref 140–400)
PMV BLD REES-ECKER: 10.1 FL (ref 7.5–12.5)
RBC # BLD AUTO: 3.26 MILLION/UL (ref 3.8–5.1)
RPR SER QL: NORMAL
WBC # BLD AUTO: 8.6 THOUSAND/UL (ref 3.8–10.8)

## 2019-04-08 ENCOUNTER — APPOINTMENT (OUTPATIENT)
Dept: LAB | Facility: HOSPITAL | Age: 34
End: 2019-04-08
Attending: OBSTETRICS & GYNECOLOGY
Payer: COMMERCIAL

## 2019-04-08 DIAGNOSIS — R73.09 ABNORMAL GLUCOSE: ICD-10-CM

## 2019-04-08 LAB
GLUCOSE 1H P 100 G GLC PO SERPL-MCNC: 156 MG/DL (ref 65–179)
GLUCOSE 2H P 100 G GLC PO SERPL-MCNC: 154 MG/DL (ref 65–154)
GLUCOSE 3H P 100 G GLC PO SERPL-MCNC: 98 MG/DL (ref 65–139)
GLUCOSE P FAST SERPL-MCNC: 85 MG/DL (ref 65–99)

## 2019-04-08 PROCEDURE — 82952 GTT-ADDED SAMPLES: CPT

## 2019-04-08 PROCEDURE — 36415 COLL VENOUS BLD VENIPUNCTURE: CPT

## 2019-04-08 PROCEDURE — 82951 GLUCOSE TOLERANCE TEST (GTT): CPT

## 2019-04-09 ENCOUNTER — TELEPHONE (OUTPATIENT)
Dept: OBGYN CLINIC | Facility: CLINIC | Age: 34
End: 2019-04-09

## 2019-04-18 ENCOUNTER — ROUTINE PRENATAL (OUTPATIENT)
Dept: OBGYN CLINIC | Facility: MEDICAL CENTER | Age: 34
End: 2019-04-18
Payer: COMMERCIAL

## 2019-04-18 VITALS — SYSTOLIC BLOOD PRESSURE: 98 MMHG | WEIGHT: 216 LBS | BODY MASS INDEX: 33.83 KG/M2 | DIASTOLIC BLOOD PRESSURE: 52 MMHG

## 2019-04-18 DIAGNOSIS — Z23 NEED FOR TDAP VACCINATION: Primary | ICD-10-CM

## 2019-04-18 DIAGNOSIS — Z34.92 ENCOUNTER FOR SUPERVISION OF NORMAL PREGNANCY IN SECOND TRIMESTER, UNSPECIFIED GRAVIDITY: ICD-10-CM

## 2019-04-18 PROBLEM — Z34.93 ENCOUNTER FOR SUPERVISION OF NORMAL PREGNANCY IN THIRD TRIMESTER: Status: ACTIVE | Noted: 2019-04-18

## 2019-04-18 PROBLEM — R51.9 PREGNANCY HEADACHE IN SECOND TRIMESTER: Status: RESOLVED | Noted: 2018-12-27 | Resolved: 2019-04-18

## 2019-04-18 PROBLEM — O26.892 PREGNANCY HEADACHE IN SECOND TRIMESTER: Status: RESOLVED | Noted: 2018-12-27 | Resolved: 2019-04-18

## 2019-04-18 PROCEDURE — 99213 OFFICE O/P EST LOW 20 MIN: CPT | Performed by: PHYSICIAN ASSISTANT

## 2019-04-23 ENCOUNTER — ULTRASOUND (OUTPATIENT)
Dept: PERINATAL CARE | Facility: MEDICAL CENTER | Age: 34
End: 2019-04-23
Payer: COMMERCIAL

## 2019-04-23 VITALS
SYSTOLIC BLOOD PRESSURE: 103 MMHG | WEIGHT: 214.8 LBS | HEART RATE: 93 BPM | BODY MASS INDEX: 33.71 KG/M2 | DIASTOLIC BLOOD PRESSURE: 69 MMHG | HEIGHT: 67 IN

## 2019-04-23 DIAGNOSIS — Z3A.30 30 WEEKS GESTATION OF PREGNANCY: ICD-10-CM

## 2019-04-23 DIAGNOSIS — O09.523 ELDERLY MULTIGRAVIDA, THIRD TRIMESTER: Primary | ICD-10-CM

## 2019-04-23 DIAGNOSIS — O44.42 LOW-LYING PLACENTA WITHOUT HEMORRHAGE, SECOND TRIMESTER: ICD-10-CM

## 2019-04-23 DIAGNOSIS — O09.529 ANTEPARTUM MULTIGRAVIDA OF ADVANCED MATERNAL AGE: ICD-10-CM

## 2019-04-23 PROCEDURE — 76817 TRANSVAGINAL US OBSTETRIC: CPT | Performed by: OBSTETRICS & GYNECOLOGY

## 2019-04-23 PROCEDURE — 99212 OFFICE O/P EST SF 10 MIN: CPT | Performed by: OBSTETRICS & GYNECOLOGY

## 2019-04-23 PROCEDURE — 76816 OB US FOLLOW-UP PER FETUS: CPT | Performed by: OBSTETRICS & GYNECOLOGY

## 2019-05-02 ENCOUNTER — ROUTINE PRENATAL (OUTPATIENT)
Dept: OBGYN CLINIC | Facility: CLINIC | Age: 34
End: 2019-05-02
Payer: COMMERCIAL

## 2019-05-02 VITALS — DIASTOLIC BLOOD PRESSURE: 48 MMHG | BODY MASS INDEX: 33.2 KG/M2 | SYSTOLIC BLOOD PRESSURE: 98 MMHG | WEIGHT: 212 LBS

## 2019-05-02 DIAGNOSIS — Z23 NEED FOR TDAP VACCINATION: ICD-10-CM

## 2019-05-02 DIAGNOSIS — O09.529 ANTEPARTUM MULTIGRAVIDA OF ADVANCED MATERNAL AGE: Primary | ICD-10-CM

## 2019-05-02 PROCEDURE — 99213 OFFICE O/P EST LOW 20 MIN: CPT | Performed by: OBSTETRICS & GYNECOLOGY

## 2019-05-14 ENCOUNTER — ROUTINE PRENATAL (OUTPATIENT)
Dept: OBGYN CLINIC | Facility: MEDICAL CENTER | Age: 34
End: 2019-05-14
Payer: COMMERCIAL

## 2019-05-14 VITALS — SYSTOLIC BLOOD PRESSURE: 100 MMHG | BODY MASS INDEX: 33.52 KG/M2 | WEIGHT: 214 LBS | DIASTOLIC BLOOD PRESSURE: 70 MMHG

## 2019-05-14 DIAGNOSIS — O09.529 ANTEPARTUM MULTIGRAVIDA OF ADVANCED MATERNAL AGE: ICD-10-CM

## 2019-05-14 DIAGNOSIS — Z34.83 ENCOUNTER FOR SUPERVISION OF OTHER NORMAL PREGNANCY IN THIRD TRIMESTER: Primary | ICD-10-CM

## 2019-05-14 PROCEDURE — 99215 OFFICE O/P EST HI 40 MIN: CPT | Performed by: PHYSICIAN ASSISTANT

## 2019-05-28 ENCOUNTER — ROUTINE PRENATAL (OUTPATIENT)
Dept: OBGYN CLINIC | Facility: CLINIC | Age: 34
End: 2019-05-28
Payer: COMMERCIAL

## 2019-05-28 VITALS
WEIGHT: 219.13 LBS | DIASTOLIC BLOOD PRESSURE: 66 MMHG | SYSTOLIC BLOOD PRESSURE: 112 MMHG | BODY MASS INDEX: 34.32 KG/M2

## 2019-05-28 DIAGNOSIS — Z34.83 ENCOUNTER FOR SUPERVISION OF OTHER NORMAL PREGNANCY IN THIRD TRIMESTER: Primary | ICD-10-CM

## 2019-05-28 DIAGNOSIS — O09.529 ANTEPARTUM MULTIGRAVIDA OF ADVANCED MATERNAL AGE: ICD-10-CM

## 2019-05-28 PROBLEM — O44.42 LOW-LYING PLACENTA WITHOUT HEMORRHAGE, SECOND TRIMESTER: Status: RESOLVED | Noted: 2019-02-26 | Resolved: 2019-05-28

## 2019-05-28 PROCEDURE — 99215 OFFICE O/P EST HI 40 MIN: CPT | Performed by: OBSTETRICS & GYNECOLOGY

## 2019-06-05 ENCOUNTER — ROUTINE PRENATAL (OUTPATIENT)
Dept: OBGYN CLINIC | Facility: MEDICAL CENTER | Age: 34
End: 2019-06-05
Payer: COMMERCIAL

## 2019-06-05 VITALS — BODY MASS INDEX: 34.3 KG/M2 | DIASTOLIC BLOOD PRESSURE: 50 MMHG | WEIGHT: 219 LBS | SYSTOLIC BLOOD PRESSURE: 94 MMHG

## 2019-06-05 DIAGNOSIS — Z34.93 ENCOUNTER FOR PREGNANCY RELATED EXAMINATION IN THIRD TRIMESTER: ICD-10-CM

## 2019-06-05 DIAGNOSIS — Z34.83 ENCOUNTER FOR SUPERVISION OF OTHER NORMAL PREGNANCY IN THIRD TRIMESTER: Primary | ICD-10-CM

## 2019-06-05 PROCEDURE — 87653 STREP B DNA AMP PROBE: CPT | Performed by: NURSE PRACTITIONER

## 2019-06-05 PROCEDURE — 99213 OFFICE O/P EST LOW 20 MIN: CPT | Performed by: NURSE PRACTITIONER

## 2019-06-07 LAB — GP B STREP DNA SPEC QL NAA+PROBE: NORMAL

## 2019-06-11 ENCOUNTER — ULTRASOUND (OUTPATIENT)
Dept: PERINATAL CARE | Facility: OTHER | Age: 34
End: 2019-06-11
Payer: COMMERCIAL

## 2019-06-11 VITALS
HEIGHT: 67 IN | BODY MASS INDEX: 34.47 KG/M2 | WEIGHT: 219.6 LBS | DIASTOLIC BLOOD PRESSURE: 72 MMHG | SYSTOLIC BLOOD PRESSURE: 112 MMHG | HEART RATE: 94 BPM

## 2019-06-11 DIAGNOSIS — Z3A.37 37 WEEKS GESTATION OF PREGNANCY: ICD-10-CM

## 2019-06-11 DIAGNOSIS — Z34.83 ENCOUNTER FOR SUPERVISION OF OTHER NORMAL PREGNANCY IN THIRD TRIMESTER: ICD-10-CM

## 2019-06-11 DIAGNOSIS — O09.529 ANTEPARTUM MULTIGRAVIDA OF ADVANCED MATERNAL AGE: ICD-10-CM

## 2019-06-11 DIAGNOSIS — O36.63X0 EXCESSIVE FETAL GROWTH AFFECTING MANAGEMENT OF PREGNANCY IN THIRD TRIMESTER, SINGLE OR UNSPECIFIED FETUS: Primary | ICD-10-CM

## 2019-06-11 PROCEDURE — 76816 OB US FOLLOW-UP PER FETUS: CPT | Performed by: OBSTETRICS & GYNECOLOGY

## 2019-06-11 PROCEDURE — 99212 OFFICE O/P EST SF 10 MIN: CPT | Performed by: OBSTETRICS & GYNECOLOGY

## 2019-06-12 ENCOUNTER — ROUTINE PRENATAL (OUTPATIENT)
Dept: OBGYN CLINIC | Facility: MEDICAL CENTER | Age: 34
End: 2019-06-12
Payer: COMMERCIAL

## 2019-06-12 VITALS — WEIGHT: 220 LBS | BODY MASS INDEX: 34.46 KG/M2 | DIASTOLIC BLOOD PRESSURE: 60 MMHG | SYSTOLIC BLOOD PRESSURE: 110 MMHG

## 2019-06-12 DIAGNOSIS — O36.63X0 EXCESSIVE FETAL GROWTH AFFECTING MANAGEMENT OF PREGNANCY IN THIRD TRIMESTER, SINGLE OR UNSPECIFIED FETUS: ICD-10-CM

## 2019-06-12 DIAGNOSIS — Z3A.37 37 WEEKS GESTATION OF PREGNANCY: ICD-10-CM

## 2019-06-12 DIAGNOSIS — Z34.83 ENCOUNTER FOR SUPERVISION OF OTHER NORMAL PREGNANCY IN THIRD TRIMESTER: Primary | ICD-10-CM

## 2019-06-12 PROCEDURE — 99215 OFFICE O/P EST HI 40 MIN: CPT | Performed by: NURSE PRACTITIONER

## 2019-06-13 ENCOUNTER — TRANSCRIBE ORDERS (OUTPATIENT)
Dept: LAB | Facility: CLINIC | Age: 34
End: 2019-06-13

## 2019-06-13 ENCOUNTER — TELEPHONE (OUTPATIENT)
Dept: PERINATAL CARE | Facility: CLINIC | Age: 34
End: 2019-06-13

## 2019-06-13 ENCOUNTER — LAB (OUTPATIENT)
Dept: LAB | Facility: CLINIC | Age: 34
End: 2019-06-13
Payer: COMMERCIAL

## 2019-06-13 DIAGNOSIS — O36.63X0 EXCESSIVE FETAL GROWTH AFFECTING MANAGEMENT OF PREGNANCY IN THIRD TRIMESTER, SINGLE OR UNSPECIFIED FETUS: ICD-10-CM

## 2019-06-13 LAB
GLUCOSE 1H P 100 G GLC PO SERPL-MCNC: 183 MG/DL (ref 65–179)
GLUCOSE 2H P 100 G GLC PO SERPL-MCNC: 172 MG/DL (ref 65–154)
GLUCOSE 3H P 100 G GLC PO SERPL-MCNC: 89 MG/DL (ref 65–139)
GLUCOSE P FAST SERPL-MCNC: 89 MG/DL (ref 65–99)

## 2019-06-13 PROCEDURE — 36415 COLL VENOUS BLD VENIPUNCTURE: CPT

## 2019-06-13 PROCEDURE — 82951 GLUCOSE TOLERANCE TEST (GTT): CPT

## 2019-06-13 PROCEDURE — 82952 GTT-ADDED SAMPLES: CPT

## 2019-06-19 ENCOUNTER — ROUTINE PRENATAL (OUTPATIENT)
Dept: OBGYN CLINIC | Age: 34
End: 2019-06-19
Payer: COMMERCIAL

## 2019-06-19 VITALS
BODY MASS INDEX: 35.03 KG/M2 | HEIGHT: 67 IN | SYSTOLIC BLOOD PRESSURE: 118 MMHG | WEIGHT: 223.2 LBS | DIASTOLIC BLOOD PRESSURE: 60 MMHG

## 2019-06-19 DIAGNOSIS — O09.529 ANTEPARTUM MULTIGRAVIDA OF ADVANCED MATERNAL AGE: ICD-10-CM

## 2019-06-19 DIAGNOSIS — Z34.90 ENCOUNTER FOR SUPERVISION OF NORMAL PREGNANCY, ANTEPARTUM, UNSPECIFIED GRAVIDITY: Primary | ICD-10-CM

## 2019-06-19 PROBLEM — Z34.93 ENCOUNTER FOR SUPERVISION OF NORMAL PREGNANCY IN THIRD TRIMESTER: Status: RESOLVED | Noted: 2019-04-18 | Resolved: 2019-06-19

## 2019-06-19 LAB
SL AMB  POCT GLUCOSE, UA: NEGATIVE
SL AMB POCT URINE PROTEIN: NEGATIVE

## 2019-06-19 PROCEDURE — 99213 OFFICE O/P EST LOW 20 MIN: CPT | Performed by: NURSE PRACTITIONER

## 2019-06-20 ENCOUNTER — ROUTINE PRENATAL (OUTPATIENT)
Dept: OBGYN CLINIC | Facility: CLINIC | Age: 34
End: 2019-06-20
Payer: COMMERCIAL

## 2019-06-20 VITALS — WEIGHT: 222 LBS | DIASTOLIC BLOOD PRESSURE: 62 MMHG | BODY MASS INDEX: 34.77 KG/M2 | SYSTOLIC BLOOD PRESSURE: 120 MMHG

## 2019-06-20 DIAGNOSIS — O24.419 GESTATIONAL DIABETES MELLITUS (GDM) IN THIRD TRIMESTER, GESTATIONAL DIABETES METHOD OF CONTROL UNSPECIFIED: ICD-10-CM

## 2019-06-20 DIAGNOSIS — O09.529 ANTEPARTUM MULTIGRAVIDA OF ADVANCED MATERNAL AGE: ICD-10-CM

## 2019-06-20 DIAGNOSIS — O34.219 PREVIOUS CESAREAN DELIVERY, ANTEPARTUM: ICD-10-CM

## 2019-06-20 PROCEDURE — 99213 OFFICE O/P EST LOW 20 MIN: CPT | Performed by: OBSTETRICS & GYNECOLOGY

## 2019-06-24 ENCOUNTER — HOSPITAL ENCOUNTER (INPATIENT)
Facility: HOSPITAL | Age: 34
LOS: 3 days | Discharge: HOME | DRG: 540 | End: 2019-06-27
Attending: OBSTETRICS & GYNECOLOGY | Admitting: OBSTETRICS & GYNECOLOGY
Payer: COMMERCIAL

## 2019-06-24 ENCOUNTER — ANESTHESIA EVENT (INPATIENT)
Dept: LABOR AND DELIVERY | Facility: HOSPITAL | Age: 34
DRG: 540 | End: 2019-06-24
Payer: COMMERCIAL

## 2019-06-24 DIAGNOSIS — O24.419 GESTATIONAL DIABETES: ICD-10-CM

## 2019-06-24 DIAGNOSIS — O34.219 PREVIOUS CESAREAN SECTION COMPLICATING PREGNANCY: ICD-10-CM

## 2019-06-24 DIAGNOSIS — Z3A.39 39 WEEKS GESTATION OF PREGNANCY: Primary | ICD-10-CM

## 2019-06-24 DIAGNOSIS — Z98.891 S/P PRIMARY LOW TRANSVERSE C-SECTION: ICD-10-CM

## 2019-06-24 LAB
ABO GROUP BLD: NORMAL
BASE EXCESS BLDCOA CALC-SCNC: -2 MMOL/L (ref 3–11)
BASE EXCESS BLDCOV CALC-SCNC: -2 MMOL/L (ref 1–9)
BASOPHILS # BLD AUTO: 0.03 THOUSANDS/ΜL (ref 0–0.1)
BASOPHILS NFR BLD AUTO: 0 % (ref 0–1)
BLD GP AB SCN SERPL QL: NEGATIVE
EOSINOPHIL # BLD AUTO: 0.04 THOUSAND/ΜL (ref 0–0.61)
EOSINOPHIL NFR BLD AUTO: 0 % (ref 0–6)
ERYTHROCYTE [DISTWIDTH] IN BLOOD BY AUTOMATED COUNT: 14.9 % (ref 11.6–15.1)
GLUCOSE SERPL-MCNC: 75 MG/DL (ref 65–140)
HCO3 BLDCOA-SCNC: 26 MMOL/L (ref 17.3–27.3)
HCO3 BLDCOV-SCNC: 24.1 MMOL/L (ref 12.2–28.6)
HCT VFR BLD AUTO: 35.4 % (ref 34.8–46.1)
HGB BLD-MCNC: 11.6 G/DL (ref 11.5–15.4)
IMM GRANULOCYTES # BLD AUTO: 0.05 THOUSAND/UL (ref 0–0.2)
IMM GRANULOCYTES NFR BLD AUTO: 1 % (ref 0–2)
LYMPHOCYTES # BLD AUTO: 2.12 THOUSANDS/ΜL (ref 0.6–4.47)
LYMPHOCYTES NFR BLD AUTO: 24 % (ref 14–44)
MCH RBC QN AUTO: 31.3 PG (ref 26.8–34.3)
MCHC RBC AUTO-ENTMCNC: 32.8 G/DL (ref 31.4–37.4)
MCV RBC AUTO: 95 FL (ref 82–98)
MONOCYTES # BLD AUTO: 0.68 THOUSAND/ΜL (ref 0.17–1.22)
MONOCYTES NFR BLD AUTO: 8 % (ref 4–12)
NEUTROPHILS # BLD AUTO: 5.97 THOUSANDS/ΜL (ref 1.85–7.62)
NEUTS SEG NFR BLD AUTO: 67 % (ref 43–75)
NRBC BLD AUTO-RTO: 0 /100 WBCS
O2 CT VFR BLDCOA CALC: 3.6 ML/DL
OXYHGB MFR BLDCOA: 17.9 %
OXYHGB MFR BLDCOV: 49.6 %
PCO2 BLDCOA: 58.2 MM[HG] (ref 30–60)
PCO2 BLDCOV: 46.3 MM HG (ref 27–43)
PH BLDCOA: 7.27 [PH] (ref 7.23–7.43)
PH BLDCOV: 7.33 [PH] (ref 7.19–7.49)
PLATELET # BLD AUTO: 246 THOUSANDS/UL (ref 149–390)
PMV BLD AUTO: 10.1 FL (ref 8.9–12.7)
PO2 BLDCOA: 13.6 MM HG (ref 5–25)
PO2 BLDCOV: 23.8 MM HG (ref 15–45)
RBC # BLD AUTO: 3.71 MILLION/UL (ref 3.81–5.12)
RH BLD: POSITIVE
SAO2 % BLDCOV: 10.3 ML/DL
SPECIMEN EXPIRATION DATE: NORMAL
WBC # BLD AUTO: 8.89 THOUSAND/UL (ref 4.31–10.16)

## 2019-06-24 PROCEDURE — 86900 BLOOD TYPING SEROLOGIC ABO: CPT | Performed by: STUDENT IN AN ORGANIZED HEALTH CARE EDUCATION/TRAINING PROGRAM

## 2019-06-24 PROCEDURE — 86901 BLOOD TYPING SEROLOGIC RH(D): CPT | Performed by: STUDENT IN AN ORGANIZED HEALTH CARE EDUCATION/TRAINING PROGRAM

## 2019-06-24 PROCEDURE — 99024 POSTOP FOLLOW-UP VISIT: CPT | Performed by: OBSTETRICS & GYNECOLOGY

## 2019-06-24 PROCEDURE — 4A0HXCZ MEASUREMENT OF PRODUCTS OF CONCEPTION, CARDIAC RATE, EXTERNAL APPROACH: ICD-10-PCS | Performed by: OBSTETRICS & GYNECOLOGY

## 2019-06-24 PROCEDURE — 86850 RBC ANTIBODY SCREEN: CPT | Performed by: STUDENT IN AN ORGANIZED HEALTH CARE EDUCATION/TRAINING PROGRAM

## 2019-06-24 PROCEDURE — 59515 CESAREAN DELIVERY: CPT | Performed by: OBSTETRICS & GYNECOLOGY

## 2019-06-24 PROCEDURE — 86592 SYPHILIS TEST NON-TREP QUAL: CPT | Performed by: STUDENT IN AN ORGANIZED HEALTH CARE EDUCATION/TRAINING PROGRAM

## 2019-06-24 PROCEDURE — 85025 COMPLETE CBC W/AUTO DIFF WBC: CPT | Performed by: STUDENT IN AN ORGANIZED HEALTH CARE EDUCATION/TRAINING PROGRAM

## 2019-06-24 PROCEDURE — 82805 BLOOD GASES W/O2 SATURATION: CPT | Performed by: OBSTETRICS & GYNECOLOGY

## 2019-06-24 PROCEDURE — NC001 PR NO CHARGE: Performed by: OBSTETRICS & GYNECOLOGY

## 2019-06-24 PROCEDURE — 82948 REAGENT STRIP/BLOOD GLUCOSE: CPT

## 2019-06-24 RX ORDER — METOCLOPRAMIDE HYDROCHLORIDE 5 MG/ML
5 INJECTION INTRAMUSCULAR; INTRAVENOUS EVERY 6 HOURS PRN
Status: ACTIVE | OUTPATIENT
Start: 2019-06-24 | End: 2019-06-25

## 2019-06-24 RX ORDER — HYDROMORPHONE HCL/PF 1 MG/ML
0.5 SYRINGE (ML) INJECTION EVERY 2 HOUR PRN
Status: ACTIVE | OUTPATIENT
Start: 2019-06-24 | End: 2019-06-25

## 2019-06-24 RX ORDER — CEFAZOLIN SODIUM 2 G/50ML
2000 SOLUTION INTRAVENOUS ONCE
Status: COMPLETED | OUTPATIENT
Start: 2019-06-24 | End: 2019-06-24

## 2019-06-24 RX ORDER — MORPHINE SULFATE 0.5 MG/ML
INJECTION, SOLUTION EPIDURAL; INTRATHECAL; INTRAVENOUS AS NEEDED
Status: DISCONTINUED | OUTPATIENT
Start: 2019-06-24 | End: 2019-06-24 | Stop reason: SURG

## 2019-06-24 RX ORDER — SODIUM CHLORIDE, SODIUM LACTATE, POTASSIUM CHLORIDE, CALCIUM CHLORIDE 600; 310; 30; 20 MG/100ML; MG/100ML; MG/100ML; MG/100ML
125 INJECTION, SOLUTION INTRAVENOUS CONTINUOUS
Status: DISCONTINUED | OUTPATIENT
Start: 2019-06-24 | End: 2019-06-27 | Stop reason: HOSPADM

## 2019-06-24 RX ORDER — FENTANYL CITRATE 50 UG/ML
INJECTION, SOLUTION INTRAMUSCULAR; INTRAVENOUS AS NEEDED
Status: DISCONTINUED | OUTPATIENT
Start: 2019-06-24 | End: 2019-06-24 | Stop reason: SURG

## 2019-06-24 RX ORDER — NALOXONE HYDROCHLORIDE 0.4 MG/ML
0.1 INJECTION, SOLUTION INTRAMUSCULAR; INTRAVENOUS; SUBCUTANEOUS
Status: ACTIVE | OUTPATIENT
Start: 2019-06-24 | End: 2019-06-25

## 2019-06-24 RX ORDER — DIPHENHYDRAMINE HYDROCHLORIDE 50 MG/ML
25 INJECTION INTRAMUSCULAR; INTRAVENOUS EVERY 6 HOURS PRN
Status: DISPENSED | OUTPATIENT
Start: 2019-06-24 | End: 2019-06-25

## 2019-06-24 RX ORDER — SODIUM CHLORIDE, SODIUM LACTATE, POTASSIUM CHLORIDE, CALCIUM CHLORIDE 600; 310; 30; 20 MG/100ML; MG/100ML; MG/100ML; MG/100ML
125 INJECTION, SOLUTION INTRAVENOUS CONTINUOUS
Status: DISPENSED | OUTPATIENT
Start: 2019-06-24 | End: 2019-06-24

## 2019-06-24 RX ORDER — FENTANYL CITRATE/PF 50 MCG/ML
25 SYRINGE (ML) INJECTION
Status: DISCONTINUED | OUTPATIENT
Start: 2019-06-24 | End: 2019-06-27 | Stop reason: HOSPADM

## 2019-06-24 RX ORDER — ONDANSETRON 2 MG/ML
4 INJECTION INTRAMUSCULAR; INTRAVENOUS EVERY 8 HOURS PRN
Status: DISCONTINUED | OUTPATIENT
Start: 2019-06-24 | End: 2019-06-27 | Stop reason: HOSPADM

## 2019-06-24 RX ORDER — KETOROLAC TROMETHAMINE 30 MG/ML
INJECTION, SOLUTION INTRAMUSCULAR; INTRAVENOUS AS NEEDED
Status: DISCONTINUED | OUTPATIENT
Start: 2019-06-24 | End: 2019-06-24 | Stop reason: SURG

## 2019-06-24 RX ORDER — DIPHENHYDRAMINE HYDROCHLORIDE 50 MG/ML
25 INJECTION INTRAMUSCULAR; INTRAVENOUS EVERY 6 HOURS PRN
Status: DISCONTINUED | OUTPATIENT
Start: 2019-06-25 | End: 2019-06-27 | Stop reason: HOSPADM

## 2019-06-24 RX ORDER — OXYTOCIN/RINGER'S LACTATE 30/500 ML
PLASTIC BAG, INJECTION (ML) INTRAVENOUS CONTINUOUS PRN
Status: DISCONTINUED | OUTPATIENT
Start: 2019-06-24 | End: 2019-06-24 | Stop reason: SURG

## 2019-06-24 RX ORDER — CALCIUM CARBONATE 200(500)MG
1000 TABLET,CHEWABLE ORAL DAILY PRN
Status: DISCONTINUED | OUTPATIENT
Start: 2019-06-24 | End: 2019-06-27 | Stop reason: HOSPADM

## 2019-06-24 RX ORDER — ACETAMINOPHEN 325 MG/1
650 TABLET ORAL EVERY 6 HOURS PRN
Status: DISCONTINUED | OUTPATIENT
Start: 2019-06-24 | End: 2019-06-27 | Stop reason: HOSPADM

## 2019-06-24 RX ORDER — DEXAMETHASONE SODIUM PHOSPHATE 10 MG/ML
8 INJECTION, SOLUTION INTRAMUSCULAR; INTRAVENOUS ONCE AS NEEDED
Status: ACTIVE | OUTPATIENT
Start: 2019-06-24 | End: 2019-06-25

## 2019-06-24 RX ORDER — OXYCODONE HYDROCHLORIDE AND ACETAMINOPHEN 5; 325 MG/1; MG/1
1 TABLET ORAL EVERY 4 HOURS PRN
Status: DISCONTINUED | OUTPATIENT
Start: 2019-06-24 | End: 2019-06-27 | Stop reason: HOSPADM

## 2019-06-24 RX ORDER — DOCUSATE SODIUM 100 MG/1
100 CAPSULE, LIQUID FILLED ORAL 2 TIMES DAILY
Status: DISCONTINUED | OUTPATIENT
Start: 2019-06-24 | End: 2019-06-27 | Stop reason: HOSPADM

## 2019-06-24 RX ORDER — OXYTOCIN/RINGER'S LACTATE 30/500 ML
62.5 PLASTIC BAG, INJECTION (ML) INTRAVENOUS CONTINUOUS
Status: DISPENSED | OUTPATIENT
Start: 2019-06-24 | End: 2019-06-24

## 2019-06-24 RX ORDER — ONDANSETRON 2 MG/ML
4 INJECTION INTRAMUSCULAR; INTRAVENOUS EVERY 8 HOURS PRN
Status: DISCONTINUED | OUTPATIENT
Start: 2019-06-24 | End: 2019-06-24

## 2019-06-24 RX ORDER — NALBUPHINE HCL 10 MG/ML
5 AMPUL (ML) INJECTION
Status: DISPENSED | OUTPATIENT
Start: 2019-06-24 | End: 2019-06-25

## 2019-06-24 RX ORDER — ONDANSETRON 2 MG/ML
4 INJECTION INTRAMUSCULAR; INTRAVENOUS EVERY 4 HOURS PRN
Status: DISCONTINUED | OUTPATIENT
Start: 2019-06-24 | End: 2019-06-24

## 2019-06-24 RX ORDER — IBUPROFEN 600 MG/1
600 TABLET ORAL EVERY 6 HOURS PRN
Status: DISCONTINUED | OUTPATIENT
Start: 2019-06-24 | End: 2019-06-27 | Stop reason: HOSPADM

## 2019-06-24 RX ORDER — ONDANSETRON 2 MG/ML
INJECTION INTRAMUSCULAR; INTRAVENOUS AS NEEDED
Status: DISCONTINUED | OUTPATIENT
Start: 2019-06-24 | End: 2019-06-24 | Stop reason: SURG

## 2019-06-24 RX ORDER — EPHEDRINE SULFATE 50 MG/ML
INJECTION INTRAVENOUS AS NEEDED
Status: DISCONTINUED | OUTPATIENT
Start: 2019-06-24 | End: 2019-06-24 | Stop reason: SURG

## 2019-06-24 RX ORDER — OXYCODONE HYDROCHLORIDE AND ACETAMINOPHEN 5; 325 MG/1; MG/1
2 TABLET ORAL EVERY 4 HOURS PRN
Status: DISCONTINUED | OUTPATIENT
Start: 2019-06-24 | End: 2019-06-27 | Stop reason: HOSPADM

## 2019-06-24 RX ORDER — BUPIVACAINE HYDROCHLORIDE 7.5 MG/ML
INJECTION, SOLUTION INTRASPINAL AS NEEDED
Status: DISCONTINUED | OUTPATIENT
Start: 2019-06-24 | End: 2019-06-24 | Stop reason: SURG

## 2019-06-24 RX ORDER — HYDROMORPHONE HCL/PF 1 MG/ML
1 SYRINGE (ML) INJECTION EVERY 2 HOUR PRN
Status: DISCONTINUED | OUTPATIENT
Start: 2019-06-24 | End: 2019-06-27 | Stop reason: HOSPADM

## 2019-06-24 RX ORDER — KETOROLAC TROMETHAMINE 30 MG/ML
30 INJECTION, SOLUTION INTRAMUSCULAR; INTRAVENOUS EVERY 6 HOURS
Status: DISCONTINUED | OUTPATIENT
Start: 2019-06-24 | End: 2019-06-27 | Stop reason: HOSPADM

## 2019-06-24 RX ADMIN — ONDANSETRON 4 MG: 2 INJECTION INTRAMUSCULAR; INTRAVENOUS at 11:09

## 2019-06-24 RX ADMIN — NALBUPHINE HYDROCHLORIDE 5 MG: 10 INJECTION, SOLUTION INTRAMUSCULAR; INTRAVENOUS; SUBCUTANEOUS at 20:43

## 2019-06-24 RX ADMIN — SODIUM CHLORIDE, SODIUM LACTATE, POTASSIUM CHLORIDE, AND CALCIUM CHLORIDE 1000 ML: .6; .31; .03; .02 INJECTION, SOLUTION INTRAVENOUS at 08:40

## 2019-06-24 RX ADMIN — SODIUM CHLORIDE, SODIUM LACTATE, POTASSIUM CHLORIDE, AND CALCIUM CHLORIDE 125 ML/HR: .6; .31; .03; .02 INJECTION, SOLUTION INTRAVENOUS at 22:16

## 2019-06-24 RX ADMIN — SODIUM CHLORIDE, SODIUM LACTATE, POTASSIUM CHLORIDE, AND CALCIUM CHLORIDE 125 ML/HR: .6; .31; .03; .02 INJECTION, SOLUTION INTRAVENOUS at 12:36

## 2019-06-24 RX ADMIN — DOCUSATE SODIUM 100 MG: 100 CAPSULE, LIQUID FILLED ORAL at 20:42

## 2019-06-24 RX ADMIN — PHENYLEPHRINE HYDROCHLORIDE 200 MCG: 10 INJECTION INTRAVENOUS at 10:45

## 2019-06-24 RX ADMIN — EPHEDRINE SULFATE 10 MG: 50 INJECTION, SOLUTION INTRAVENOUS at 10:45

## 2019-06-24 RX ADMIN — SODIUM CHLORIDE, SODIUM LACTATE, POTASSIUM CHLORIDE, AND CALCIUM CHLORIDE 125 ML/HR: .6; .31; .03; .02 INJECTION, SOLUTION INTRAVENOUS at 14:02

## 2019-06-24 RX ADMIN — KETOROLAC TROMETHAMINE 30 MG: 30 INJECTION, SOLUTION INTRAMUSCULAR at 11:17

## 2019-06-24 RX ADMIN — PHENYLEPHRINE HYDROCHLORIDE 50 MCG/MIN: 10 INJECTION INTRAVENOUS at 10:45

## 2019-06-24 RX ADMIN — SODIUM CHLORIDE, SODIUM LACTATE, POTASSIUM CHLORIDE, AND CALCIUM CHLORIDE: .6; .31; .03; .02 INJECTION, SOLUTION INTRAVENOUS at 11:00

## 2019-06-24 RX ADMIN — Medication 250 MILLI-UNITS/MIN: at 11:09

## 2019-06-24 RX ADMIN — Medication 62.5 MILLI-UNITS/MIN: at 12:55

## 2019-06-24 RX ADMIN — KETOROLAC TROMETHAMINE 30 MG: 30 INJECTION, SOLUTION INTRAMUSCULAR at 17:23

## 2019-06-24 RX ADMIN — DIPHENHYDRAMINE HYDROCHLORIDE 25 MG: 50 INJECTION, SOLUTION INTRAMUSCULAR; INTRAVENOUS at 15:10

## 2019-06-24 RX ADMIN — FENTANYL CITRATE 100 MCG: 50 INJECTION, SOLUTION INTRAMUSCULAR; INTRAVENOUS at 10:48

## 2019-06-24 RX ADMIN — SODIUM CHLORIDE, SODIUM LACTATE, POTASSIUM CHLORIDE, AND CALCIUM CHLORIDE 125 ML/HR: .6; .31; .03; .02 INJECTION, SOLUTION INTRAVENOUS at 09:24

## 2019-06-24 RX ADMIN — MORPHINE SULFATE 3 MG: 0.5 INJECTION, SOLUTION EPIDURAL; INTRATHECAL; INTRAVENOUS at 10:50

## 2019-06-24 RX ADMIN — BUPIVACAINE HYDROCHLORIDE IN DEXTROSE 2 ML: 7.5 INJECTION, SOLUTION SUBARACHNOID at 10:33

## 2019-06-24 RX ADMIN — CEFAZOLIN SODIUM 2000 MG: 2 SOLUTION INTRAVENOUS at 10:27

## 2019-06-25 LAB
ERYTHROCYTE [DISTWIDTH] IN BLOOD BY AUTOMATED COUNT: 14.8 % (ref 11.6–15.1)
HCT VFR BLD AUTO: 25.9 % (ref 34.8–46.1)
HGB BLD-MCNC: 8.5 G/DL (ref 11.5–15.4)
MCH RBC QN AUTO: 31.6 PG (ref 26.8–34.3)
MCHC RBC AUTO-ENTMCNC: 32.8 G/DL (ref 31.4–37.4)
MCV RBC AUTO: 96 FL (ref 82–98)
PLATELET # BLD AUTO: 195 THOUSANDS/UL (ref 149–390)
PMV BLD AUTO: 10.1 FL (ref 8.9–12.7)
RBC # BLD AUTO: 2.69 MILLION/UL (ref 3.81–5.12)
RPR SER QL: NORMAL
WBC # BLD AUTO: 8.21 THOUSAND/UL (ref 4.31–10.16)

## 2019-06-25 PROCEDURE — 99024 POSTOP FOLLOW-UP VISIT: CPT | Performed by: OBSTETRICS & GYNECOLOGY

## 2019-06-25 PROCEDURE — 85027 COMPLETE CBC AUTOMATED: CPT | Performed by: OBSTETRICS & GYNECOLOGY

## 2019-06-25 RX ADMIN — DIPHENHYDRAMINE HYDROCHLORIDE 25 MG: 50 INJECTION, SOLUTION INTRAMUSCULAR; INTRAVENOUS at 03:03

## 2019-06-25 RX ADMIN — KETOROLAC TROMETHAMINE 30 MG: 30 INJECTION, SOLUTION INTRAMUSCULAR at 16:44

## 2019-06-25 RX ADMIN — HYDROMORPHONE HYDROCHLORIDE 1 MG: 1 INJECTION, SOLUTION INTRAMUSCULAR; INTRAVENOUS; SUBCUTANEOUS at 09:59

## 2019-06-25 RX ADMIN — KETOROLAC TROMETHAMINE 30 MG: 30 INJECTION, SOLUTION INTRAMUSCULAR at 06:01

## 2019-06-25 RX ADMIN — HYDROMORPHONE HYDROCHLORIDE 1 MG: 1 INJECTION, SOLUTION INTRAMUSCULAR; INTRAVENOUS; SUBCUTANEOUS at 16:15

## 2019-06-25 RX ADMIN — OXYCODONE HYDROCHLORIDE AND ACETAMINOPHEN 1 TABLET: 5; 325 TABLET ORAL at 20:01

## 2019-06-25 RX ADMIN — OXYCODONE HYDROCHLORIDE AND ACETAMINOPHEN 1 TABLET: 5; 325 TABLET ORAL at 23:42

## 2019-06-25 RX ADMIN — KETOROLAC TROMETHAMINE 30 MG: 30 INJECTION, SOLUTION INTRAMUSCULAR at 00:05

## 2019-06-26 PROCEDURE — 99024 POSTOP FOLLOW-UP VISIT: CPT | Performed by: OBSTETRICS & GYNECOLOGY

## 2019-06-26 PROCEDURE — 90715 TDAP VACCINE 7 YRS/> IM: CPT | Performed by: STUDENT IN AN ORGANIZED HEALTH CARE EDUCATION/TRAINING PROGRAM

## 2019-06-26 RX ADMIN — IBUPROFEN 600 MG: 600 TABLET ORAL at 10:19

## 2019-06-26 RX ADMIN — IBUPROFEN 600 MG: 600 TABLET ORAL at 22:31

## 2019-06-26 RX ADMIN — OXYCODONE HYDROCHLORIDE AND ACETAMINOPHEN 2 TABLET: 5; 325 TABLET ORAL at 13:37

## 2019-06-26 RX ADMIN — IBUPROFEN 600 MG: 600 TABLET ORAL at 16:48

## 2019-06-26 RX ADMIN — DOCUSATE SODIUM 100 MG: 100 CAPSULE, LIQUID FILLED ORAL at 08:30

## 2019-06-26 RX ADMIN — TETANUS TOXOID, REDUCED DIPHTHERIA TOXOID AND ACELLULAR PERTUSSIS VACCINE, ADSORBED 0.5 ML: 5; 2.5; 8; 8; 2.5 SUSPENSION INTRAMUSCULAR at 15:49

## 2019-06-26 RX ADMIN — OXYCODONE HYDROCHLORIDE AND ACETAMINOPHEN 1 TABLET: 5; 325 TABLET ORAL at 06:44

## 2019-06-26 RX ADMIN — IBUPROFEN 600 MG: 600 TABLET ORAL at 03:38

## 2019-06-26 RX ADMIN — DOCUSATE SODIUM 100 MG: 100 CAPSULE, LIQUID FILLED ORAL at 16:47

## 2019-06-26 RX ADMIN — OXYCODONE HYDROCHLORIDE AND ACETAMINOPHEN 2 TABLET: 5; 325 TABLET ORAL at 20:14

## 2019-06-27 VITALS
SYSTOLIC BLOOD PRESSURE: 110 MMHG | BODY MASS INDEX: 34.84 KG/M2 | DIASTOLIC BLOOD PRESSURE: 65 MMHG | RESPIRATION RATE: 18 BRPM | WEIGHT: 222 LBS | TEMPERATURE: 97.7 F | HEART RATE: 75 BPM | OXYGEN SATURATION: 98 % | HEIGHT: 67 IN

## 2019-06-27 PROCEDURE — 99024 POSTOP FOLLOW-UP VISIT: CPT | Performed by: OBSTETRICS & GYNECOLOGY

## 2019-06-27 RX ORDER — OXYCODONE HYDROCHLORIDE AND ACETAMINOPHEN 5; 325 MG/1; MG/1
1 TABLET ORAL EVERY 6 HOURS PRN
Qty: 15 TABLET | Refills: 0 | Status: SHIPPED | OUTPATIENT
Start: 2019-06-27 | End: 2019-07-07

## 2019-06-27 RX ORDER — DOCUSATE SODIUM 100 MG/1
100 CAPSULE, LIQUID FILLED ORAL 2 TIMES DAILY
Qty: 10 CAPSULE | Refills: 0 | Status: SHIPPED | OUTPATIENT
Start: 2019-06-27 | End: 2019-11-08 | Stop reason: ALTCHOICE

## 2019-06-27 RX ORDER — IBUPROFEN 600 MG/1
600 TABLET ORAL EVERY 6 HOURS PRN
Qty: 30 TABLET | Refills: 0 | Status: SHIPPED | OUTPATIENT
Start: 2019-06-27 | End: 2019-11-08 | Stop reason: ALTCHOICE

## 2019-06-27 RX ADMIN — IBUPROFEN 600 MG: 600 TABLET ORAL at 09:33

## 2019-06-27 RX ADMIN — OXYCODONE HYDROCHLORIDE AND ACETAMINOPHEN 2 TABLET: 5; 325 TABLET ORAL at 06:33

## 2019-06-27 RX ADMIN — OXYCODONE HYDROCHLORIDE AND ACETAMINOPHEN 2 TABLET: 5; 325 TABLET ORAL at 13:33

## 2019-06-27 RX ADMIN — DOCUSATE SODIUM 100 MG: 100 CAPSULE, LIQUID FILLED ORAL at 09:33

## 2019-07-08 ENCOUNTER — POSTPARTUM VISIT (OUTPATIENT)
Dept: OBGYN CLINIC | Facility: CLINIC | Age: 34
End: 2019-07-08

## 2019-07-08 VITALS — DIASTOLIC BLOOD PRESSURE: 70 MMHG | BODY MASS INDEX: 31.64 KG/M2 | WEIGHT: 202 LBS | SYSTOLIC BLOOD PRESSURE: 124 MMHG

## 2019-07-08 DIAGNOSIS — Z98.891 S/P PRIMARY LOW TRANSVERSE C-SECTION: Primary | ICD-10-CM

## 2019-07-08 DIAGNOSIS — Z48.89 ENCOUNTER FOR POST SURGICAL WOUND CHECK: ICD-10-CM

## 2019-07-08 LAB — PLACENTA IN STORAGE: NORMAL

## 2019-07-08 PROCEDURE — 99024 POSTOP FOLLOW-UP VISIT: CPT | Performed by: OBSTETRICS & GYNECOLOGY

## 2019-07-08 NOTE — PROGRESS NOTES
Assessment:  S/p primary  section for presumed macrosomia, GDMA1  Doing well postoperatively  Operative findings again reviewed  Plan:   1  Continue any current medications  2  Wound care discussed  3  Activity restrictions: no lifting more than 25 pounds  4  Anticipated return to work: not applicable  5  Follow up: 4 weeks for exam        Gerhardt Shavers is a 37 y o  female who presents to the clinic 2 weeks status post primary  section for GDMA1, presumed macrosomia  Eating a regular diet without difficulty  Bowel movements are normal  The patient is not having any pain  The following portions of the patient's history were reviewed and updated as appropriate: allergies, current medications, past family history, past medical history, past social history, past surgical history and problem list     Review of Systems  Pertinent items are noted in HPI  Objective     /70   Wt 91 6 kg (202 lb)   LMP 2018   Breastfeeding?  Yes Comment: Pumping  BMI 31 64 kg/m²   General:  alert and oriented, in no acute distress   Abdomen: soft, non-tender   Incision:   healing well, no drainage, no erythema, no hernia, no seroma, no swelling, no dehiscence, incision well approximated

## 2019-08-06 ENCOUNTER — POSTPARTUM VISIT (OUTPATIENT)
Dept: OBGYN CLINIC | Facility: CLINIC | Age: 34
End: 2019-08-06
Payer: COMMERCIAL

## 2019-08-06 VITALS — SYSTOLIC BLOOD PRESSURE: 104 MMHG | BODY MASS INDEX: 30.79 KG/M2 | WEIGHT: 196.6 LBS | DIASTOLIC BLOOD PRESSURE: 62 MMHG

## 2019-08-06 DIAGNOSIS — Z98.891 S/P PRIMARY LOW TRANSVERSE C-SECTION: ICD-10-CM

## 2019-08-06 DIAGNOSIS — O24.410 DIET CONTROLLED GESTATIONAL DIABETES MELLITUS (GDM) IN THIRD TRIMESTER: ICD-10-CM

## 2019-08-06 PROCEDURE — 99213 OFFICE O/P EST LOW 20 MIN: CPT | Performed by: OBSTETRICS & GYNECOLOGY

## 2019-08-06 NOTE — PROGRESS NOTES
OB POSTPARTUM VISIT PROGRESS NOTE  Date of Encounter: 2019    Lyle De Leon    : 1985  (37 y o )  MR: [id number]    Martina Hernadez is in for her postpartum visit  She is now [id number]  She delivered by primary  section  She's generally doing well, denies current pain or bleeding issues, and has no significant depression issues  She is breast feeding exclusively  We discussed all appropriate contraceptive options and she chooses progestin-only OCP  Objective   EXAM:  GENERAL: alert, well appearing, and in no distress  VITALS: Blood pressure 104/62, weight 89 2 kg (196 lb 9 6 oz), last menstrual period 2018, currently breastfeeding  BMI: Body mass index is 30 79 kg/m²  BREASTS: Bilaterally nontender, no masses or nipple discharge  ABDOMEN: soft, NTND; incision well-healed  EXTREMITIES: All normal  No edema  PELVIC:   VULVA: Nml EGBUS  VAGINA: Normal, no discharge  Introitus well healed, slightly tender  CERVIX: Normal, no discharge  UTERUS: Anteverted, NSSC, Mobile, NT    RIGHT ADNEXUM: Nontender, no mass  LEFT ADNEXUM: Nontender, no mass  RECTAL: Deferred      Assessment/Plan   Normal PP exam  Encounter for initial prescription of OCP - Micronor  H/o gestational diabetes - 3 hour gtt order given to patient  Annual with Pap 2020    Boubacar Rowell MD

## 2019-08-14 ENCOUNTER — TELEPHONE (OUTPATIENT)
Dept: OBGYN CLINIC | Facility: CLINIC | Age: 34
End: 2019-08-14

## 2019-08-14 DIAGNOSIS — Z30.011 ENCOUNTER FOR INITIAL PRESCRIPTION OF CONTRACEPTIVE PILLS: Primary | ICD-10-CM

## 2019-08-14 NOTE — TELEPHONE ENCOUNTER
Spoke with patient  Advised Micronor pending provider signature  Patient requests 90 days with 1 RF

## 2019-08-14 NOTE — TELEPHONE ENCOUNTER
Patient states discussed progesterone only pill at postpartum visit  Has not been sent to pharmacy as of yet

## 2019-08-15 RX ORDER — ACETAMINOPHEN AND CODEINE PHOSPHATE 120; 12 MG/5ML; MG/5ML
1 SOLUTION ORAL DAILY
Qty: 90 TABLET | Refills: 1 | Status: SHIPPED | OUTPATIENT
Start: 2019-08-15 | End: 2020-01-28

## 2019-09-11 ENCOUNTER — TELEPHONE (OUTPATIENT)
Dept: OBGYN CLINIC | Facility: CLINIC | Age: 34
End: 2019-09-11

## 2019-09-11 ENCOUNTER — SOCIAL WORK (OUTPATIENT)
Dept: BEHAVIORAL/MENTAL HEALTH CLINIC | Facility: CLINIC | Age: 34
End: 2019-09-11
Payer: COMMERCIAL

## 2019-09-11 DIAGNOSIS — F41.8 POSTPARTUM ANXIETY: Primary | ICD-10-CM

## 2019-09-11 PROCEDURE — 90834 PSYTX W PT 45 MINUTES: CPT | Performed by: SOCIAL WORKER
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
## 2019-09-11 NOTE — TELEPHONE ENCOUNTER
----- Message from Luanne Cates MD sent at 9/11/2019 11:15 AM EDT -----  Regarding: RE: Med Request  Please let patient know I sent rx in as requested  ----- Message -----  From: Jeremiah Syed  Sent: 9/11/2019  10:54 AM EDT  To: Luanne Cates MD  Subject: Med Request                                      Jo-Ann Prater saw Garrick Ortiz today for postpartum depression  She is crying often, anxious about a lot, isolating due to feeling difficulty getting out alone with baby  Denies suicidal/homicidal ideations or desire to harm baby  Does have a hx of some anxiety  Has a lot of family support too  Would you be willing to write Zoloft 50mg, take 1/2 for first 7 days then increase to full dose for her? If so, can you call in and have your staff notify her that you did  If not, let me know if she needs to come in  She has an appointment with me in a couple of weeks, said that she would come to support group and is seeing you in November  Enma let me know if this is ok  Thanks as always for the referrals and support    Kristin Umaña LCSW

## 2019-09-11 NOTE — PATIENT INSTRUCTIONS
Follow up in 3 weeks, attend [facility] support Groups, recommended increased socialization and finding time a lone once a week  Sent in basket message to Ob/GYN requesting medication

## 2019-09-27 ENCOUNTER — TELEPHONE (OUTPATIENT)
Dept: OBGYN CLINIC | Facility: CLINIC | Age: 34
End: 2019-09-27

## 2019-09-27 NOTE — TELEPHONE ENCOUNTER
Pt called in stating she needs a note stating that she can have a dental extraction done due to breast feeding Fax to # [phone] dental office is stating she can not have it done without the clearance  Please advise if note can be created an faxed

## 2019-11-08 ENCOUNTER — ANNUAL EXAM (OUTPATIENT)
Dept: OBGYN CLINIC | Facility: CLINIC | Age: 34
End: 2019-11-08
Payer: COMMERCIAL

## 2019-11-08 VITALS
DIASTOLIC BLOOD PRESSURE: 64 MMHG | HEIGHT: 67 IN | WEIGHT: 198.2 LBS | SYSTOLIC BLOOD PRESSURE: 122 MMHG | BODY MASS INDEX: 31.11 KG/M2

## 2019-11-08 DIAGNOSIS — Z01.419 ENCOUNTER FOR GYNECOLOGICAL EXAMINATION (GENERAL) (ROUTINE) WITHOUT ABNORMAL FINDINGS: Primary | ICD-10-CM

## 2019-11-08 PROBLEM — Z34.90 ENCOUNTER FOR SUPERVISION OF NORMAL PREGNANCY, ANTEPARTUM: Status: RESOLVED | Noted: 2019-06-19 | Resolved: 2019-11-08

## 2019-11-08 PROBLEM — O09.529 ANTEPARTUM MULTIGRAVIDA OF ADVANCED MATERNAL AGE: Status: RESOLVED | Noted: 2018-12-27 | Resolved: 2019-11-08

## 2019-11-08 PROBLEM — O36.63X0 EXCESSIVE FETAL GROWTH AFFECTING MANAGEMENT OF MOTHER IN THIRD TRIMESTER, ANTEPARTUM: Status: RESOLVED | Noted: 2019-06-11 | Resolved: 2019-11-08

## 2019-11-08 PROBLEM — Z3A.39 39 WEEKS GESTATION OF PREGNANCY: Status: RESOLVED | Noted: 2019-06-11 | Resolved: 2019-11-08

## 2019-11-08 PROBLEM — O24.419 GESTATIONAL DIABETES MELLITUS (GDM) IN THIRD TRIMESTER: Status: RESOLVED | Noted: 2019-06-20 | Resolved: 2019-11-08

## 2019-11-08 PROCEDURE — 99395 PREV VISIT EST AGE 18-39: CPT | Performed by: OBSTETRICS & GYNECOLOGY

## 2019-11-08 NOTE — PROGRESS NOTES
Assessment/Plan:    1  Encounter for gynecological examination (general) (routine) without abnormal findings  - Pap due 2020    2  Surveillance of progestin-only OCP  - continue micronor until done breastfeeding      Subjective      Rochelle Cervantes is a 37 y o  female who presents for annual exam  Periods are absent due to breastfeeding  Dysmenorrhea:none  Cyclic symptoms include none  No intermenstrual bleeding, spotting, or discharge  The patient denies urinary or sexual issues  Current contraception: oral progesterone-only contraceptive  History of abnormal Pap smear: no  Family history of uterine or ovarian cancer: no  Regular self breast exam: yes  History of abnormal mammogram: no  Family history of breast cancer: no  History of abnormal lipids: no  Menstrual History:  OB History        4    Para   2    Term   2       0    AB   2    Living   2       SAB   1    TAB   0    Ectopic   0    Multiple   0    Live Births   2           Obstetric Comments   FOB # 2, elderly multigravida, long interval between pregnancies,low lying placenta-resolved, SAB,VIP, increase fetal growth           No LMP recorded  The following portions of the patient's history were reviewed and updated as appropriate: allergies, current medications, past family history, past medical history, past social history, past surgical history and problem list     Review of Systems  Pertinent items are noted in HPI  Objective      /64 (BP Location: Right arm, Patient Position: Sitting, Cuff Size: Large)   Ht 5' 7 25" (1 708 m)   Wt 89 9 kg (198 lb 3 2 oz)   Breastfeeding? Yes   BMI 30 81 kg/m²     General:   alert and oriented, in no acute distress, appears stated age and cooperative   Heart:    Breasts: regular rate and rhythm, S1, S2 normal, no murmur, click, rub or gallop   appear normal, no suspicious masses, no skin or nipple changes or axillary nodes     Lungs: clear to auscultation bilaterally Abdomen: soft, non-tender, without masses or organomegaly   Vulva: normal   Vagina: normal mucosa   Cervix: multiparous appearance and no lesions   Uterus: normal size, mobile, non-tender   Adnexa: normal adnexa and no mass, fullness, tenderness

## 2019-12-03 ENCOUNTER — OFFICE VISIT (OUTPATIENT)
Dept: URGENT CARE | Facility: MEDICAL CENTER | Age: 34
End: 2019-12-03
Payer: COMMERCIAL

## 2019-12-03 VITALS
DIASTOLIC BLOOD PRESSURE: 70 MMHG | HEART RATE: 93 BPM | OXYGEN SATURATION: 99 % | BODY MASS INDEX: 31.1 KG/M2 | SYSTOLIC BLOOD PRESSURE: 104 MMHG | WEIGHT: 198.13 LBS | RESPIRATION RATE: 20 BRPM | HEIGHT: 67 IN | TEMPERATURE: 97.9 F

## 2019-12-03 DIAGNOSIS — J06.9 ACUTE URI: Primary | ICD-10-CM

## 2019-12-03 PROCEDURE — 99213 OFFICE O/P EST LOW 20 MIN: CPT | Performed by: NURSE PRACTITIONER

## 2019-12-03 RX ORDER — FLUTICASONE PROPIONATE 50 MCG
1 SPRAY, SUSPENSION (ML) NASAL DAILY
Qty: 1 BOTTLE | Refills: 0 | Status: SHIPPED | OUTPATIENT
Start: 2019-12-03 | End: 2022-03-31
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
## 2019-12-03 NOTE — PATIENT INSTRUCTIONS
Flonase 1 spray each nostril daily   Continue to use lozenges and salt water gargles for discomfort  If pain worsens or you develop a fever follow up with your PCP    Sore Throat, Ambulatory Care   GENERAL INFORMATION:   A sore throat  is often caused by a cold or flu virus  A sore throat may also be caused by bacteria such as strep  Other causes include smoking, a runny nose, allergies, or acid reflux  Seek immediate care for the following symptoms:   · Trouble breathing or swallowing because your throat is swollen or sore    · Drooling because it hurts too much to swallow    · A painful lump in your throat that does not go away after 5 days    · A fever higher than 102? F (39?C) or lasts longer than 3 days    · Confusion    · Blood in your throat or ear  Treatment for a sore throat  will depend on the cause how severe it is  A sore throat cause by a virus will go away on its own without treatment  You will need antibiotics if your sore throat is caused by bacteria  Your sore throat should start to feel better within 3 to 5 days for both viral and bacterial infections  Care for your sore throat:   · Gargle with salt water  Mix ¼ teaspoon salt in a glass of warm water and gargle  This may help reduce swelling in your throat  · Take ibuprofen or acetaminophen:  These medicines decrease pain and fever  They are available without a doctor's order  Ask your healthcare provider which medicine is best for you  Ask how much to take and how often to take it  · Drink more liquids  Cold or warm drinks may help soothe your sore throat  Drinking liquids can also help prevent dehydration  · Use a cool-steam humidifier  to help moisten the air in your room and reduce your throat pain  · Use lozenges, ice, soft foods, or popsicles  to soothe your throat  · Rest your throat as much as possible  Try not to use your voice  This may irritate your throat and worsen your symptoms    Follow up with your healthcare provider as directed:  Write down your questions so you remember to ask them during your visits  CARE AGREEMENT:   You have the right to help plan your care  Learn about your health condition and how it may be treated  Discuss treatment options with your caregivers to decide what care you want to receive  You always have the right to refuse treatment  The above information is an  only  It is not intended as medical advice for individual conditions or treatments  Talk to your doctor, nurse or pharmacist before following any medical regimen to see if it is safe and effective for you  © 2014 [address] is for End User's use only and may not be sold, redistributed or otherwise used for commercial purposes  All illustrations and images included in CareNotes® are the copyrighted property of A D A M , Inc  or Zack Sood

## 2020-01-28 DIAGNOSIS — Z30.011 ENCOUNTER FOR INITIAL PRESCRIPTION OF CONTRACEPTIVE PILLS: ICD-10-CM

## 2020-01-28 RX ORDER — ACETAMINOPHEN AND CODEINE PHOSPHATE 120; 12 MG/5ML; MG/5ML
SOLUTION ORAL
Qty: 28 TABLET | Refills: 0 | Status: SHIPPED | OUTPATIENT
Start: 2020-01-28 | End: 2020-02-24

## 2020-02-23 DIAGNOSIS — Z30.011 ENCOUNTER FOR INITIAL PRESCRIPTION OF CONTRACEPTIVE PILLS: ICD-10-CM

## 2020-02-24 RX ORDER — ACETAMINOPHEN AND CODEINE PHOSPHATE 120; 12 MG/5ML; MG/5ML
SOLUTION ORAL
Qty: 28 TABLET | Refills: 0 | Status: SHIPPED | OUTPATIENT
Start: 2020-02-24 | End: 2020-03-24

## 2020-02-27 NOTE — TELEPHONE ENCOUNTER
Patient is aware of prescription given  [facility] Division of Hospital Medicine  Trisha Duran MD  Pager (M-F, 8A-5P): [phone]  Other Times:  [phone]      Patient is a 89y old  Female who presents with a chief complaint of S/P fall at the [place] with RIGHT hip pain (2020 13:08)      SUBJECTIVE / OVERNIGHT EVENTS: no acute events overnight. pain controlled with tylenol per patient. no complaint at this time.    ADDITIONAL REVIEW OF SYSTEMS:    MEDICATIONS  (STANDING):  heparin  Injectable 5000 Unit(s) SubCutaneous every 12 hours  losartan 100 milliGRAM(s) Oral daily  pantoprazole    Tablet 40 milliGRAM(s) Oral before breakfast  senna 2 Tablet(s) Oral at bedtime    MEDICATIONS  (PRN):  acetaminophen   Tablet .. 650 milliGRAM(s) Oral every 6 hours PRN Moderate Pain (4 - 6)  oxyCODONE    IR 5 milliGRAM(s) Oral every 4 hours PRN Severe Pain (7 - 10)      CAPILLARY BLOOD GLUCOSE        I&O's Summary    2020 07:01  -  2020 07:00  --------------------------------------------------------  IN: 150 mL / OUT: 250 mL / NET: -100 mL        PHYSICAL EXAM:  Vital Signs Last 24 Hrs  T(C): 36.7 (2020 11:15), Max: 36.9 (2020 16:00)  T(F): 98.1 (2020 11:15), Max: 98.5 (2020 16:00)  HR: 68 (2020 11:15) (60 - 74)  BP: 109/61 (2020 11:15) (109/61 - 159/76)  BP(mean): --  RR: 18 (2020 11:15) (16 - 18)  SpO2: 95% (2020 11:15) (95% - 99%)    CONSTITUTIONAL: NAD, frail appearing  EYES: PERRLA; conjunctiva and sclera clear  ENMT: Moist oral mucosa, no pharyngeal injection or exudates; normal dentition  NECK: Supple, no palpable venu  RESPIRATORY: Normal respiratory effort; lungs are clear to auscultation bilaterally  CARDIOVASCULAR: Regular rate and rhythm, normal S1 and S2, no murmur/rub/gallop; No lower extremity edema; Peripheral pulses are 2+ bilaterally  ABDOMEN: Soft, Nondistended,  Nontender to palpation, normoactive bowel sounds  MUSCULOSKELETAL:  no swelling, +tenderness to palpation of R hip and low right back  PSYCH: A+O to person, place, and time; affect appropriate  NEUROLOGY: CN 2-12 are intact and symmetric; no gross sensory deficits   SKIN: venous stasis changes of lower extremities b/l, no hematoma nor ecchymoses    LABS:                        12.5   7.83  )-----------( 201      ( 2020 07:14 )             40.5         143  |  105  |  12  ----------------------------<  95  3.9   |  27  |  0.63    Ca    9.0      2020 07:13    TPro  7.7  /  Alb  4.2  /  TBili  0.4  /  DBili  x   /  AST  24  /  ALT  13  /  AlkPhos  76  02-    PT/INR - ( 2020 12:21 )   PT: 11.9 sec;   INR: 1.03 ratio         PTT - ( 2020 12:21 )  PTT:33.0 sec  CARDIAC MARKERS ( 2020 12:21 )  x     / x     / 547 U/L / x     / x          Urinalysis Basic - ( 2020 10:12 )    Color: Yellow / Appearance: Clear / S.031 / pH: x  Gluc: x / Ketone: Negative  / Bili: Negative / Urobili: <2 mg/dL   Blood: x / Protein: Trace / Nitrite: Negative   Leuk Esterase: Negative / RBC: x / WBC x   Sq Epi: x / Non Sq Epi: x / Bacteria: x          RADIOLOGY & ADDITIONAL TESTS:  Results Reviewed:  UA unremarkable, no leukocytosis, stable H/H  Imaging Personally Reviewed:   - US abdominal aneurysm reviewed, AAA stable at 3.2 unchanged from prior imaging  - MRI R hip: Acute right greater trochanteric fracture with intertrochanteric extension just past the midline posteriorly. There is no extension through the lesser trochanter.  Electrocardiogram Personally Reviewed:    COORDINATION OF CARE:  Care Discussed with Consultants/Other Providers [Y]: medicine NP Chery  Prior or Outpatient Records Reviewed [Y/N]:

## 2020-03-24 DIAGNOSIS — Z30.011 ENCOUNTER FOR INITIAL PRESCRIPTION OF CONTRACEPTIVE PILLS: ICD-10-CM

## 2020-03-24 RX ORDER — ACETAMINOPHEN AND CODEINE PHOSPHATE 120; 12 MG/5ML; MG/5ML
SOLUTION ORAL
Qty: 28 TABLET | Refills: 0 | Status: SHIPPED | OUTPATIENT
Start: 2020-03-24 | End: 2020-04-20

## 2020-04-19 DIAGNOSIS — Z30.011 ENCOUNTER FOR INITIAL PRESCRIPTION OF CONTRACEPTIVE PILLS: ICD-10-CM

## 2020-04-20 RX ORDER — ACETAMINOPHEN AND CODEINE PHOSPHATE 120; 12 MG/5ML; MG/5ML
SOLUTION ORAL
Qty: 28 TABLET | Refills: 0 | Status: SHIPPED | OUTPATIENT
Start: 2020-04-20 | End: 2020-05-26

## 2020-05-23 DIAGNOSIS — Z30.011 ENCOUNTER FOR INITIAL PRESCRIPTION OF CONTRACEPTIVE PILLS: ICD-10-CM

## 2020-05-26 RX ORDER — ACETAMINOPHEN AND CODEINE PHOSPHATE 120; 12 MG/5ML; MG/5ML
SOLUTION ORAL
Qty: 28 TABLET | Refills: 0 | Status: SHIPPED | OUTPATIENT
Start: 2020-05-26 | End: 2020-06-25

## 2020-06-25 DIAGNOSIS — Z30.011 ENCOUNTER FOR INITIAL PRESCRIPTION OF CONTRACEPTIVE PILLS: ICD-10-CM

## 2020-06-25 RX ORDER — ACETAMINOPHEN AND CODEINE PHOSPHATE 120; 12 MG/5ML; MG/5ML
SOLUTION ORAL
Qty: 28 TABLET | Refills: 0 | Status: SHIPPED | OUTPATIENT
Start: 2020-06-25 | End: 2020-07-28

## 2020-07-28 DIAGNOSIS — Z30.011 ENCOUNTER FOR INITIAL PRESCRIPTION OF CONTRACEPTIVE PILLS: ICD-10-CM

## 2020-07-28 RX ORDER — ACETAMINOPHEN AND CODEINE PHOSPHATE 120; 12 MG/5ML; MG/5ML
SOLUTION ORAL
Qty: 28 TABLET | Refills: 0 | Status: SHIPPED | OUTPATIENT
Start: 2020-07-28 | End: 2020-08-26

## 2020-08-26 DIAGNOSIS — Z30.011 ENCOUNTER FOR INITIAL PRESCRIPTION OF CONTRACEPTIVE PILLS: ICD-10-CM

## 2020-08-26 RX ORDER — ACETAMINOPHEN AND CODEINE PHOSPHATE 120; 12 MG/5ML; MG/5ML
SOLUTION ORAL
Qty: 28 TABLET | Refills: 0 | Status: SHIPPED | OUTPATIENT
Start: 2020-08-26 | End: 2020-09-25

## 2020-09-25 DIAGNOSIS — Z30.011 ENCOUNTER FOR INITIAL PRESCRIPTION OF CONTRACEPTIVE PILLS: ICD-10-CM

## 2020-09-25 RX ORDER — ACETAMINOPHEN AND CODEINE PHOSPHATE 120; 12 MG/5ML; MG/5ML
SOLUTION ORAL
Qty: 28 TABLET | Refills: 0 | Status: SHIPPED | OUTPATIENT
Start: 2020-09-25 | End: 2020-10-27

## 2020-10-27 DIAGNOSIS — Z30.011 ENCOUNTER FOR INITIAL PRESCRIPTION OF CONTRACEPTIVE PILLS: ICD-10-CM

## 2020-10-27 RX ORDER — ACETAMINOPHEN AND CODEINE PHOSPHATE 120; 12 MG/5ML; MG/5ML
SOLUTION ORAL
Qty: 28 TABLET | Refills: 0 | Status: SHIPPED | OUTPATIENT
Start: 2020-10-27 | End: 2020-11-30

## 2020-11-27 DIAGNOSIS — Z30.011 ENCOUNTER FOR INITIAL PRESCRIPTION OF CONTRACEPTIVE PILLS: ICD-10-CM

## 2020-11-30 RX ORDER — ACETAMINOPHEN AND CODEINE PHOSPHATE 120; 12 MG/5ML; MG/5ML
SOLUTION ORAL
Qty: 28 TABLET | Refills: 0 | Status: SHIPPED | OUTPATIENT
Start: 2020-11-30 | End: 2020-12-28

## 2020-12-24 DIAGNOSIS — Z30.011 ENCOUNTER FOR INITIAL PRESCRIPTION OF CONTRACEPTIVE PILLS: ICD-10-CM

## 2020-12-28 RX ORDER — ACETAMINOPHEN AND CODEINE PHOSPHATE 120; 12 MG/5ML; MG/5ML
SOLUTION ORAL
Qty: 28 TABLET | Refills: 0 | Status: SHIPPED | OUTPATIENT
Start: 2020-12-28 | End: 2021-01-25

## 2021-01-24 DIAGNOSIS — Z30.011 ENCOUNTER FOR INITIAL PRESCRIPTION OF CONTRACEPTIVE PILLS: ICD-10-CM

## 2021-01-25 RX ORDER — ACETAMINOPHEN AND CODEINE PHOSPHATE 120; 12 MG/5ML; MG/5ML
SOLUTION ORAL
Qty: 28 TABLET | Refills: 0 | Status: SHIPPED | OUTPATIENT
Start: 2021-01-25 | End: 2021-02-18

## 2021-02-18 DIAGNOSIS — Z30.011 ENCOUNTER FOR INITIAL PRESCRIPTION OF CONTRACEPTIVE PILLS: ICD-10-CM

## 2021-02-18 RX ORDER — ACETAMINOPHEN AND CODEINE PHOSPHATE 120; 12 MG/5ML; MG/5ML
SOLUTION ORAL
Qty: 28 TABLET | Refills: 0 | Status: SHIPPED | OUTPATIENT
Start: 2021-02-18 | End: 2021-03-16

## 2021-03-15 DIAGNOSIS — Z30.011 ENCOUNTER FOR INITIAL PRESCRIPTION OF CONTRACEPTIVE PILLS: ICD-10-CM

## 2021-03-16 RX ORDER — ACETAMINOPHEN AND CODEINE PHOSPHATE 120; 12 MG/5ML; MG/5ML
SOLUTION ORAL
Qty: 28 TABLET | Refills: 0 | Status: SHIPPED | OUTPATIENT
Start: 2021-03-16 | End: 2021-04-14 | Stop reason: SDUPTHER

## 2021-04-14 ENCOUNTER — ANNUAL EXAM (OUTPATIENT)
Dept: OBGYN CLINIC | Facility: CLINIC | Age: 36
End: 2021-04-14
Payer: COMMERCIAL

## 2021-04-14 VITALS
SYSTOLIC BLOOD PRESSURE: 112 MMHG | WEIGHT: 193 LBS | DIASTOLIC BLOOD PRESSURE: 62 MMHG | HEIGHT: 64 IN | BODY MASS INDEX: 32.95 KG/M2

## 2021-04-14 DIAGNOSIS — R63.5 WEIGHT GAIN: ICD-10-CM

## 2021-04-14 DIAGNOSIS — Z30.41 ENCOUNTER FOR SURVEILLANCE OF CONTRACEPTIVE PILLS: ICD-10-CM

## 2021-04-14 DIAGNOSIS — Z11.51 SCREENING FOR HUMAN PAPILLOMAVIRUS (HPV): ICD-10-CM

## 2021-04-14 DIAGNOSIS — Z01.419 ENCOUNTER FOR GYNECOLOGICAL EXAMINATION (GENERAL) (ROUTINE) WITHOUT ABNORMAL FINDINGS: Primary | ICD-10-CM

## 2021-04-14 PROBLEM — Z98.891 S/P PRIMARY LOW TRANSVERSE C-SECTION: Status: RESOLVED | Noted: 2019-06-24 | Resolved: 2021-04-14

## 2021-04-14 PROCEDURE — G0145 SCR C/V CYTO,THINLAYER,RESCR: HCPCS | Performed by: OBSTETRICS & GYNECOLOGY

## 2021-04-14 PROCEDURE — 99395 PREV VISIT EST AGE 18-39: CPT | Performed by: OBSTETRICS & GYNECOLOGY

## 2021-04-14 PROCEDURE — 87624 HPV HI-RISK TYP POOLED RSLT: CPT | Performed by: OBSTETRICS & GYNECOLOGY

## 2021-04-14 RX ORDER — ACETAMINOPHEN AND CODEINE PHOSPHATE 120; 12 MG/5ML; MG/5ML
1 SOLUTION ORAL DAILY
Qty: 84 TABLET | Refills: 3 | Status: SHIPPED | OUTPATIENT
Start: 2021-04-14 | End: 2022-03-28
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
## 2021-04-16 LAB
HPV HR 12 DNA CVX QL NAA+PROBE: POSITIVE
HPV16 DNA CVX QL NAA+PROBE: NEGATIVE
HPV18 DNA CVX QL NAA+PROBE: NEGATIVE

## 2021-04-20 LAB
LAB AP GYN PRIMARY INTERPRETATION: NORMAL
Lab: NORMAL

## 2021-04-21 ENCOUNTER — TELEPHONE (OUTPATIENT)
Dept: OBGYN CLINIC | Facility: CLINIC | Age: 36
End: 2021-04-21

## 2021-04-21 LAB
EST. AVERAGE GLUCOSE BLD GHB EST-MCNC: 111 (CALC)
EST. AVERAGE GLUCOSE BLD GHB EST-SCNC: 6.2 (CALC)
HBA1C MFR BLD: 5.5 % OF TOTAL HGB
TSH SERPL-ACNC: 1.14 MIU/L

## 2021-04-21 NOTE — TELEPHONE ENCOUNTER
Pt returned my call  I advised pt and explained on the colpo  Pt agreed to plan of action and scheduled  Pt scheduled for 04/23 in [facility] with stone

## 2021-04-21 NOTE — TELEPHONE ENCOUNTER
Contacted pt # [id number] communciation consent on file - lmom advising pap and hpv results and as directed with call bk number to call and schedule and nurse line should she have any questions or concerns

## 2021-04-21 NOTE — TELEPHONE ENCOUNTER
----- Message from Christopher Starks MD sent at 4/21/2021  7:56 AM EDT -----  Please have patient schedule colpo with me

## 2021-04-23 ENCOUNTER — PROCEDURE VISIT (OUTPATIENT)
Dept: OBGYN CLINIC | Facility: CLINIC | Age: 36
End: 2021-04-23
Payer: COMMERCIAL

## 2021-04-23 VITALS — BODY MASS INDEX: 33.25 KG/M2 | SYSTOLIC BLOOD PRESSURE: 112 MMHG | DIASTOLIC BLOOD PRESSURE: 62 MMHG | WEIGHT: 192.2 LBS

## 2021-04-23 DIAGNOSIS — B97.7 HPV (HUMAN PAPILLOMA VIRUS) INFECTION: Primary | ICD-10-CM

## 2021-04-23 DIAGNOSIS — Z32.02 PREGNANCY TEST NEGATIVE: ICD-10-CM

## 2021-04-23 LAB — SL AMB POCT URINE HCG: NORMAL

## 2021-04-23 PROCEDURE — 88305 TISSUE EXAM BY PATHOLOGIST: CPT | Performed by: PATHOLOGY

## 2021-04-23 PROCEDURE — 81025 URINE PREGNANCY TEST: CPT | Performed by: OBSTETRICS & GYNECOLOGY

## 2021-04-23 PROCEDURE — 57454 BX/CURETT OF CERVIX W/SCOPE: CPT | Performed by: OBSTETRICS & GYNECOLOGY

## 2021-04-23 NOTE — PROGRESS NOTES
Colposcopy    Date/Time: 4/23/2021 8:45 AM  Performed by: Enriqueta Jones MD  Authorized by: Enriqueta Jones MD     Consent:     Consent obtained:  Verbal    Consent given by:  Patient    Procedural risks discussed:  Bleeding, possible continued pain and repeat procedure    Patient questions answered: yes      Patient agrees, verbalizes understanding, and wants to proceed: yes    Indication:     Indications: +HR HPV  Procedure:     Procedure: Colposcopy w/ cervical biopsy and ECC      Under satisfactory analgesia the patient was prepped and draped in the dorsal lithotomy position: yes      [place] speculum was placed in the vagina: yes      Under colposcopic examination the transition zone was seen in entirety: yes      Endocervix was curetted using a Kevorkian curette: yes      Cervical biopsy performed with a cervical biopsy punch: yes      Monsel's solution was applied: yes      Biopsy(s): yes      Location:  ECC and 6 o'clock    Specimen to pathology: yes    Post-procedure:     Findings comment:  Normal    Patient tolerance of procedure:   Tolerated well, no immediate complications

## 2021-04-28 ENCOUNTER — TELEPHONE (OUTPATIENT)
Dept: OBGYN CLINIC | Facility: CLINIC | Age: 36
End: 2021-04-28

## 2021-04-28 NOTE — TELEPHONE ENCOUNTER
Spoke to pt and reviewed results from tissue    ----- Message from Robby Fenton MD sent at 4/28/2021 11:00 AM EDT -----  Notify no dysplasia  Needs Pap/HPV one year

## 2022-03-25 DIAGNOSIS — Z30.41 ENCOUNTER FOR SURVEILLANCE OF CONTRACEPTIVE PILLS: ICD-10-CM

## 2022-03-28 RX ORDER — ACETAMINOPHEN AND CODEINE PHOSPHATE 120; 12 MG/5ML; MG/5ML
SOLUTION ORAL
Qty: 84 TABLET | Refills: 3 | Status: SHIPPED | OUTPATIENT
Start: 2022-03-28 | End: 2022-03-31

## 2022-03-31 ENCOUNTER — INITIAL PRENATAL (OUTPATIENT)
Dept: OBGYN CLINIC | Facility: CLINIC | Age: 37
End: 2022-03-31

## 2022-03-31 ENCOUNTER — ULTRASOUND (OUTPATIENT)
Dept: OBGYN CLINIC | Facility: CLINIC | Age: 37
End: 2022-03-31
Payer: MEDICARE

## 2022-03-31 VITALS — WEIGHT: 200 LBS | BODY MASS INDEX: 31.39 KG/M2 | HEIGHT: 67 IN

## 2022-03-31 VITALS — WEIGHT: 200.2 LBS | BODY MASS INDEX: 34.63 KG/M2 | SYSTOLIC BLOOD PRESSURE: 114 MMHG | DIASTOLIC BLOOD PRESSURE: 72 MMHG

## 2022-03-31 DIAGNOSIS — Z13.79 GENETIC SCREENING: ICD-10-CM

## 2022-03-31 DIAGNOSIS — Z34.81 PRENATAL CARE, SUBSEQUENT PREGNANCY, FIRST TRIMESTER: Primary | ICD-10-CM

## 2022-03-31 DIAGNOSIS — N91.1 SECONDARY AMENORRHEA: Primary | ICD-10-CM

## 2022-03-31 PROBLEM — Z98.891 HISTORY OF CESAREAN DELIVERY: Status: ACTIVE | Noted: 2022-03-31

## 2022-03-31 PROBLEM — O09.521 ADVANCED MATERNAL AGE IN MULTIGRAVIDA, FIRST TRIMESTER: Status: ACTIVE | Noted: 2022-03-31

## 2022-03-31 PROBLEM — Z30.09 CONSULTATION FOR FEMALE STERILIZATION: Status: ACTIVE | Noted: 2022-03-31

## 2022-03-31 PROCEDURE — 76817 TRANSVAGINAL US OBSTETRIC: CPT | Performed by: OBSTETRICS & GYNECOLOGY

## 2022-03-31 PROCEDURE — OBC: Performed by: OBSTETRICS & GYNECOLOGY

## 2022-03-31 NOTE — PROGRESS NOTES
OB INTAKE INTERVIEW  Patient is 43 y o y o  who presents for OB intake at 11 wks 1day   She is accompanied by: Self via phone call   The father of her baby Irwin Cardoso) is involved in the pregnancy and is 34years old  [id number]  Last Menstrual Period: Unknown  Ultrasound: Measured 11 weeks 1 days on 3/31/2022 with Dr Huerta  Estimated Date of Delivery: 10/19/2022 confirmed by 11wk US  Signs/Symptoms of Pregnancy  Current pregnancy symptoms: fatigue, and old  Constipation YES-Colace/Increase fluids   Headaches no  Cramping/spotting no   PICA cravings no    Diabetes-  Body mass index is 31 32 kg/m²  If patient has 1 or more, please order early 1 hour GTT  History of GDM no  BMI >35 no  History of PCOS or current metformin use no  History of LGA/macrosomic infant (4000g/9lbs) YES 9lbs 6oz     If patient has 2 or more, please order early 1 hour GTT  BMI>30 YES  AMA YES   First degree relative with type 2 diabetes no  History of chronic HTN, hyperlipidemia, elevated A1C no  High risk race (, , ,  or ) no    Hypertension- if you answer yes, please order preeclampsia labs (cbc, comprehensive metabolic panel, urine protein creatinine ratio, uric acid)  History of of chronic HTN no  History of gestational HTN no  History of preeclampsia, eclampsia, or HELLP syndrome no  History of diabetes no  History of lupus, autoimmune disease, kidney disease no    Thyroid- if yes order TSH with reflex T4  History of thyroid disease no    Bleeding Disorder or Hx of DVT-patient or first degree relative with history of  Order the following if not done previously     (Factor V, antithrombin III, prothrombin gene mutation, protein C and S Ag, lupus anticoagulant, anticardiolipin, beta-2 glycoprotein)   no    OB/GYN-  History of abnormal pap smear YES ---Due for Repeat PAP--  History of HPV YES-04/2021 hx of Colpo   History of Herpes/HSV no   History of other STI (gonorrhea, chlamydia, trich) no  History of prior  YES 2005   History of prior  YES 2019   History of  delivery prior to 36 weeks 6 days no  History of blood transfusion YES x2 times   Ok for blood transfusion Yes     Substance screening- if yes outside of tobacco for her or anyone in her home-order urine drug screen  History of tobacco use no  Currently using tobacco no  Currently using alcohol no  Presently using drugs no  Past drug use  no  IV drug use-If yes add Hep C antibody to labs no  Partner drug use no  Parent/Family drug use no    MRSA Screening-   Does the pt have a hx of MRSA? no  If yes- please follow MRSA protocol and obtain a nasal swab for MRSA culture    Immunizations:  Influenza vaccine given this season-None   Discussed Tdap [email or web address]   Discussed COVID Vaccine-None     Genetic/M-  Do you or your partner have a history of any of the following in yourselves or first degree relatives? Cystic fibrosis no  Spinal muscular atrophy no  Hemoglobinopathy/Sickle Cell/Thalassemia no  Fragile X Intellectual Disability no    If yes, discuss carrier screening and recommend consultation with [facility]/genetic counseling  If no, discuss option for carrier screening and/or genetic testing with [facility]  Patient interested Yes   Appointment at [facility] made Yes- [facility] 2022 Anatomy-2022     Interview education  [facility] Pregnancy Essentials Book reviewed and discussed-Yes copy sent with after visit summary  Nurse/Family Partnership- patient may qualify; referral placed-None     Prenatal lab work scripts Yes   Extra labs ordered:  1GTT     The patient has a history now or in prior pregnancy notable for:  alcohol use-Sober for 6 years  Details that I feel the provider should be aware of: Cinthia Menendezsom and Mica Palma are expecting Baby#2 together; this was an unplanned but welcomed pregnancy-Ally also has a daughter from a previous relationship   Cinthia Blaise is not sure if she would like a repeat ; but she will like a tubal after delivery  Pt with hx of anxiety and depression after last pregnancy- currently not taking any medications and feels is well controlled  Pt is feeling mild constipation reviewed adding colace and increasing fluids  Today we Reviewed pregnancy essential guide, medication list, foods to avoid, caffeine limit, seat belt, appt schedule both with OB office and M  Lab information provided today, informed to complete labs this week or seven days before next appt  PN1 visit scheduled on 2022 with Wilma Gongora  The patient was oriented to our practice, reviewed delivering physicians and [facility] for Delivery  All questions were answered      Interviewed by: Mychal Dan, Joaquim Zimmerman

## 2022-03-31 NOTE — PATIENT INSTRUCTIONS
Congratulation!! Please review our Pregnancy Essential Guide and [facility] Virtual tour from our [employer]  [facility] Guide  [facility] ([address] [place])     [address] ([employer])     [facility] ([employer])     Page 9- Medication during Pregnancy  Page 13- Foods to avoid  Page 25- Seat belt Guide  Pregnancy   WHAT YOU NEED TO KNOW:   A normal pregnancy lasts about 40 weeks  The first trimester lasts from your last period through the 12th week of pregnancy  The second trimester lasts from the 13th week through the 23rd week  The third trimester lasts from the 24th week until your baby is born  If you know the date of your last period, your healthcare provider can estimate your due date  You may give birth to your baby any time from 37 weeks to 2 weeks after your due date  DISCHARGE INSTRUCTIONS:   Return to the emergency department if:   · You develop a severe headache that does not go away  · You have new or increased vision changes, such as blurred or spotted vision  · You have new or increased swelling in your face or hands  · You have pain or cramping in your abdomen or low back  · You have vaginal bleeding  Call your doctor or obstetrician if:   · You have abdominal cramps, pressure, or tightening  · You have a change in vaginal discharge  · You cannot keep food or drinks down, and you are losing weight  · You have chills or a fever  · You have vaginal itching, burning, or pain  · You have yellow, green, white, or foul-smelling vaginal discharge  · You have pain or burning when you urinate, less urine than usual, or pink or bloody urine  · You have questions or concerns about your condition or care  Medicines:   · Prenatal vitamins  provide some of the extra vitamins and minerals you need during pregnancy   Prenatal vitamins may also help to decrease the risk for certain birth defects  · Take your medicine as directed  Contact your healthcare provider if you think your medicine is not helping or if you have side effects  Tell him or her if you are allergic to any medicine  Keep a list of the medicines, vitamins, and herbs you take  Include the amounts, and when and why you take them  Bring the list or the pill bottles to follow-up visits  Carry your medicine list with you in case of an emergency  Prenatal care:  Prenatal care is a series of visits with your healthcare provider throughout your pregnancy  Prenatal care can help prevent problems during pregnancy and childbirth  At each prenatal visit, your provider will weigh you and check your blood pressure  He or she will also check your baby's heartbeat and growth  You may also need the following at some visits:  · A pelvic exam  allows your healthcare provider to see your cervix (the bottom part of your uterus)  Your healthcare provider will use a speculum to open your vagina  He or she will check the size and shape of your uterus  At your first prenatal visit, you may also have a Pap smear  This is a test to check your cervix for abnormal cells  · Blood tests  may be done to check for any of the following:     ? Gestational diabetes or anemia (low iron level)    ? Blood type or Rh factor, or certain birth defects    ? Immunity to certain diseases, such as chickenpox or rubella    ? An infection, such as a sexually transmitted infection, HIV, or hepatitis B    · Hepatitis B  may need to be prevented or treated  Hepatitis B is inflammation of the liver caused by the hepatitis B virus (HBV)  HBV can spread from a mother to her baby during delivery  You will be checked for HBV as early as possible in the first trimester of each pregnancy  You need the test even if you received the hepatitis B vaccine or were tested before  You may need to have an HBV infection treated before you give birth      · Urine tests  may also be done to check for sugar and protein  These can be signs of gestational diabetes or preeclampsia  Urine tests may also be done to check for signs of infection  · A gestational diabetes screen  may be done  Your healthcare provider may order either a 1-step or 2-step oral glucose tolerance test (OGTT)  ? 1-step OGTT:  Your blood sugar level will be tested after you have not eaten for 8 hours (fasting)  You will then be given a glucose drink  Your level will be tested again 1 hour and 2 hours after you finish the drink  ? 2-step OGTT:  You do not have to fast for the first part of the test  You will have the glucose drink at any time of day  Your blood sugar level will be checked 1 hour later  If your blood sugar is higher than a certain level, another test will be ordered  You will fast and your blood sugar level will be tested  You will have the glucose drink  Your blood will be tested again 1 hour, 2 hours, and 3 hours after you finish the glucose drink  · A fetal ultrasound  shows pictures of your baby inside your uterus  The pictures are used to check your baby's development, movement, and position  · Genetic disorder screening tests  may be offered to you  These tests check your baby's risk for genetic disorders such as Down syndrome  A screening test may include blood tests and an ultrasound  Blood tests may be used to check your DNA or your partner's DNA  Genetic tests are not always accurate or complete  Your baby may be born with a genetic disorder that did not show up in the tests  Talk to your healthcare provider about any concerns you have with genetic testing  Body changes that may occur during your pregnancy:   · Breast changes  you will experience include tenderness and tingling during the early part of your pregnancy  Your breasts will become larger  You may need to use a support bra  You may see a thin, yellow fluid, called colostrum, leak from your nipples during the second trimester  Colostrum is a liquid that changes to milk about 3 days after you give birth  · Skin changes and stretch marks  may occur during your pregnancy  You may have red marks, called stretch marks, on your skin  Stretch marks will usually fade after pregnancy  Use lotion if your skin is dry and itchy  The skin on your face, around your nipples, and below your belly button may darken  Most of the time, your skin will return to its normal color after your baby is born  · Morning sickness  is nausea and vomiting that can happen at any time of day  Avoid fatty and spicy foods  Eat small meals throughout the day instead of large meals  Jovita may help to decrease nausea  Ask your healthcare provider about other ways of decreasing nausea and vomiting  · Heartburn  may be caused by changes in your hormones during pregnancy  Your growing uterus may also push your stomach upward and force stomach acid to back up into your esophagus  Eat 4 or 5 small meals each day instead of large meals  Avoid spicy foods  Avoid eating right before bedtime  · Constipation  may develop during your pregnancy  To treat constipation, eat foods high in fiber such as fiber cereals, beans, fruits, vegetables, whole-grain breads, and prune juice  Get regular exercise and drink plenty of water  Your healthcare provider may also suggest a fiber supplement to soften your bowel movements  Talk to your healthcare provider before you use any medicines to decrease constipation  · Hemorrhoids  are enlarged veins in the rectal area  They may cause pain, itching, and bright red bleeding from your rectum  To decrease your risk for hemorrhoids, prevent constipation and do not strain to have a bowel movement  If you have hemorrhoids, soak in a tub of warm water to ease discomfort  Ask your healthcare provider how you can treat hemorrhoids  · Leg cramps and swelling  may be caused by low calcium levels or the added weight of pregnancy   Raise your legs above the level of your heart to decrease swelling  During a leg cramp, stretch or massage the muscle that has the cramp  Heat may help decrease pain and muscle spasms  Apply heat on your muscle for 20 to 30 minutes every 2 hours for as many days as directed  · Back pain  may occur as your baby grows  Do not stand for long periods of time or lift heavy items  Use good posture while you stand, squat, or bend  Wear low-heeled shoes with good support  Rest may also help to relieve back pain  Ask your healthcare provider about exercises you can do to strengthen your back muscles  Stay healthy during your pregnancy:       · Eat a variety of healthy foods  Healthy foods include fruits, vegetables, whole-grain breads, low-fat dairy foods, beans, lean meats, and fish  Drink liquids as directed  Ask how much liquid to drink each day and which liquids are best for you  Limit caffeine to less than 200 milligrams each day  Limit your intake of fish to 2 servings each week  Choose fish low in mercury such as canned light tuna, shrimp, crab, salmon, cod, or tilapia  Do not  eat fish high in mercury such as swordfish, tilefish, mikki mackerel, and shark  · Take prenatal vitamins as directed  Your need for certain vitamins and minerals, such as folic acid, increases during pregnancy  Prenatal vitamins provide some of the extra vitamins and minerals you need  Prenatal vitamins may also help to decrease the risk for certain birth defects  · Ask how much weight you should gain during your pregnancy  Too much or too little weight gain can be unhealthy for you and your baby  · Talk to your healthcare provider about exercise  Moderate exercise can help you stay fit  Your healthcare provider will help you plan an exercise program that is safe for you during pregnancy  · Do not smoke  Smoking increases your risk for a miscarriage and heart and blood vessel problems   Smoking can cause your baby to be born too early or weigh less at birth  Quit smoking as soon as you think you might be pregnant  Ask your healthcare provider for information if you need help quitting  · Do not drink alcohol  Alcohol passes from your body to your baby through the placenta  It can affect your baby's brain development and cause fetal alcohol syndrome (FAS)  FAS is a group of conditions that causes mental, behavior, and growth problems  · Talk to your healthcare provider before you take any medicines  Many medicines may harm your baby if you take them when you are pregnant  Do not take any medicines, vitamins, herbs, or supplements without first talking to your healthcare provider  Never use illegal or street drugs (such as marijuana or cocaine) while you are pregnant  Safety tips:   · Avoid hot tubs and saunas  Do not use a hot tub or sauna while you are pregnant, especially during your first trimester  Hot tubs and saunas may raise your baby's temperature and increase the risk for birth defects  · Avoid toxoplasmosis  This is an infection caused by eating raw meat or being around infected cat feces  It can cause birth defects, miscarriages, and other problems  Wash your hands after you touch raw meat  Make sure any meat is well-cooked before you eat it  Avoid raw eggs and unpasteurized milk  Use gloves or ask someone else to clean your cat's litter box while you are pregnant  · Ask your healthcare provider about travel  The most comfortable time to travel is during the second trimester  Ask your healthcare provider if you can travel after 36 weeks  You may not be able to travel in an airplane after 36 weeks  He or she may also recommend that you avoid long road trips  Follow up with your doctor or obstetrician as directed:  Go to all of your prenatal visits during your pregnancy  Write down your questions so you remember to ask them during your visits    © Copyright [employer] 2022 Information is for End User's use only and may not be sold, redistributed or otherwise used for commercial purposes  All illustrations and images included in CareNotes® are the copyrighted property of [employer] M , Inc  or [address] [employer]  The above information is an  only  It is not intended as medical advice for individual conditions or treatments  Talk to your doctor, nurse or pharmacist before following any medical regimen to see if it is safe and effective for you

## 2022-03-31 NOTE — LETTER
03/31/2022       April Fails     [facility]-[id number]     Due Date-10/19/2022       The above named patient is currently 11 weeks pregnant  The patient is currently under our care for her Pregnancy  Thank you,       George Nicole

## 2022-03-31 NOTE — PROGRESS NOTES
EARLY PREGNANCY ULTRASOUND    Ultrasound Probe Disinfection    A transvaginal ultrasound was performed  Prior to use, disinfection was performed with High Level Disinfection Process ([employer])  Probe serial number [id number] was used    Fariba Quiros MD  22  9:47 AM      SUBJECTIVE    HPI: Cindy Moses is a 43 y o   female here today for early pregnancy ultrasound  No LMP recorded  (Menstrual status: Birth Control)    This pregnancy was unplanned and a suprise  She is accompanied by her partner   OBHx is significant for AMA, prior  for macrosomia  Not Taking a prenatal vitamin yet, will start today  No Known Allergies    Current Outpatient Medications:     Prenatal Vit-Fe Fumarate-FA (PRENATAL COMPLETE PO), Take 1 tablet by mouth daily  (Patient not taking: Reported on 3/31/2022 ), Disp: , Rfl:       OBJECTIVE  Vitals:    22 0934   BP: 114/72   BP Location: Left arm   Patient Position: Sitting   Cuff Size: Large   Weight: 90 8 kg (200 lb 3 2 oz)         Early OB Ultrasound Procedure Note: Transvaginal US    Technician: Study performed by the interpreting physician    Indications:  Early gestation, dating & viability    Procedure Details   The entire study was done at settings of 6 0 to 8 0 MHz  Gestational Sac: Present  Crown-rump length is 4 2cm and calculates to an estimated gestational age of 5 weeks, 1 days  Embryonic cardiac activity is seen at a rate of 169 b/min  Description of fetal anatomy Normal    Cul-de-sac: no fluid  Left ovary: normal  Right ovary: normal    Findings:  Viable, beckford intrauterine pregnancy      ASSESSMENT  Early pregnancy at 11 weeks 1 days with a calculated KACEY of 10/19/22 based on Ultrasound today, Unsure LMP  Desires sterilization  PLAN    1 - RTO for PN-1 visit  MFM referral given - aware to call ASAP    Intake already scheduled for later today    All questions were answered & patient expressed understanding      Andrzej Padilla MD

## 2022-04-02 ENCOUNTER — APPOINTMENT (OUTPATIENT)
Dept: LAB | Facility: MEDICAL CENTER | Age: 37
End: 2022-04-02
Payer: MEDICARE

## 2022-04-02 DIAGNOSIS — Z34.81 PRENATAL CARE, SUBSEQUENT PREGNANCY, FIRST TRIMESTER: ICD-10-CM

## 2022-04-02 LAB
ABO GROUP BLD: NORMAL
BACTERIA UR QL AUTO: ABNORMAL /HPF
BASOPHILS # BLD AUTO: 0.02 THOUSANDS/ΜL (ref 0–0.1)
BASOPHILS NFR BLD AUTO: 0 % (ref 0–1)
BILIRUB UR QL STRIP: NEGATIVE
BLD GP AB SCN SERPL QL: NEGATIVE
CLARITY UR: CLEAR
COLOR UR: YELLOW
EOSINOPHIL # BLD AUTO: 0.01 THOUSAND/ΜL (ref 0–0.61)
EOSINOPHIL NFR BLD AUTO: 0 % (ref 0–6)
ERYTHROCYTE [DISTWIDTH] IN BLOOD BY AUTOMATED COUNT: 14.2 % (ref 11.6–15.1)
GLUCOSE UR STRIP-MCNC: NEGATIVE MG/DL
HCT VFR BLD AUTO: 36.2 % (ref 34.8–46.1)
HCV AB SER QL: NORMAL
HGB BLD-MCNC: 12.1 G/DL (ref 11.5–15.4)
HGB UR QL STRIP.AUTO: ABNORMAL
IMM GRANULOCYTES # BLD AUTO: 0.01 THOUSAND/UL (ref 0–0.2)
IMM GRANULOCYTES NFR BLD AUTO: 0 % (ref 0–2)
KETONES UR STRIP-MCNC: NEGATIVE MG/DL
LEUKOCYTE ESTERASE UR QL STRIP: NEGATIVE
LYMPHOCYTES # BLD AUTO: 2.23 THOUSANDS/ΜL (ref 0.6–4.47)
LYMPHOCYTES NFR BLD AUTO: 35 % (ref 14–44)
MCH RBC QN AUTO: 30 PG (ref 26.8–34.3)
MCHC RBC AUTO-ENTMCNC: 33.4 G/DL (ref 31.4–37.4)
MCV RBC AUTO: 90 FL (ref 82–98)
MONOCYTES # BLD AUTO: 0.41 THOUSAND/ΜL (ref 0.17–1.22)
MONOCYTES NFR BLD AUTO: 6 % (ref 4–12)
MUCOUS THREADS UR QL AUTO: ABNORMAL
NEUTROPHILS # BLD AUTO: 3.76 THOUSANDS/ΜL (ref 1.85–7.62)
NEUTS SEG NFR BLD AUTO: 59 % (ref 43–75)
NITRITE UR QL STRIP: NEGATIVE
NON-SQ EPI CELLS URNS QL MICRO: ABNORMAL /HPF
NRBC BLD AUTO-RTO: 0 /100 WBCS
PH UR STRIP.AUTO: 6 [PH]
PLATELET # BLD AUTO: 291 THOUSANDS/UL (ref 149–390)
PMV BLD AUTO: 10.4 FL (ref 8.9–12.7)
PROT UR STRIP-MCNC: NEGATIVE MG/DL
RBC # BLD AUTO: 4.04 MILLION/UL (ref 3.81–5.12)
RBC #/AREA URNS AUTO: ABNORMAL /HPF
RH BLD: POSITIVE
RUBV IGG SERPL IA-ACNC: 109.5 IU/ML
SP GR UR STRIP.AUTO: 1.02 (ref 1–1.03)
UROBILINOGEN UR STRIP-ACNC: <2 MG/DL
WBC # BLD AUTO: 6.44 THOUSAND/UL (ref 4.31–10.16)
WBC #/AREA URNS AUTO: ABNORMAL /HPF

## 2022-04-02 PROCEDURE — 80081 OBSTETRIC PANEL INC HIV TSTG: CPT

## 2022-04-02 PROCEDURE — 86803 HEPATITIS C AB TEST: CPT

## 2022-04-02 PROCEDURE — 81001 URINALYSIS AUTO W/SCOPE: CPT

## 2022-04-02 PROCEDURE — 36415 COLL VENOUS BLD VENIPUNCTURE: CPT

## 2022-04-02 PROCEDURE — 87086 URINE CULTURE/COLONY COUNT: CPT

## 2022-04-03 LAB
BACTERIA UR CULT: NORMAL
HBV SURFACE AG SER QL: NORMAL
HIV 1+2 AB+HIV1 P24 AG SERPL QL IA: NORMAL

## 2022-04-04 LAB — RPR SER QL: NORMAL

## 2022-04-12 ENCOUNTER — INITIAL PRENATAL (OUTPATIENT)
Dept: OBGYN CLINIC | Facility: MEDICAL CENTER | Age: 37
End: 2022-04-12
Payer: MEDICARE

## 2022-04-12 ENCOUNTER — ROUTINE PRENATAL (OUTPATIENT)
Dept: PERINATAL CARE | Facility: OTHER | Age: 37
End: 2022-04-12
Payer: MEDICARE

## 2022-04-12 VITALS
HEIGHT: 67 IN | WEIGHT: 200.2 LBS | SYSTOLIC BLOOD PRESSURE: 112 MMHG | BODY MASS INDEX: 31.42 KG/M2 | DIASTOLIC BLOOD PRESSURE: 64 MMHG | HEART RATE: 75 BPM

## 2022-04-12 VITALS — BODY MASS INDEX: 31.39 KG/M2 | WEIGHT: 200.4 LBS

## 2022-04-12 DIAGNOSIS — O09.291 HISTORY OF GESTATIONAL DIABETES IN PRIOR PREGNANCY, CURRENTLY PREGNANT IN FIRST TRIMESTER: ICD-10-CM

## 2022-04-12 DIAGNOSIS — F10.21 HISTORY OF ALCOHOLISM (HCC): ICD-10-CM

## 2022-04-12 DIAGNOSIS — O09.291 HISTORY OF PREMATURE RUPTURE OF MEMBRANES IN PREVIOUS PREGNANCY, CURRENTLY PREGNANT IN FIRST TRIMESTER: ICD-10-CM

## 2022-04-12 DIAGNOSIS — Z86.32 HISTORY OF GESTATIONAL DIABETES IN PRIOR PREGNANCY, CURRENTLY PREGNANT IN FIRST TRIMESTER: ICD-10-CM

## 2022-04-12 DIAGNOSIS — O09.521 ADVANCED MATERNAL AGE IN MULTIGRAVIDA, FIRST TRIMESTER: ICD-10-CM

## 2022-04-12 DIAGNOSIS — O09.299 HISTORY OF MACROSOMIA IN INFANT IN PRIOR PREGNANCY, CURRENTLY PREGNANT: ICD-10-CM

## 2022-04-12 DIAGNOSIS — Z3A.12 12 WEEKS GESTATION OF PREGNANCY: ICD-10-CM

## 2022-04-12 DIAGNOSIS — O34.219 HISTORY OF CESAREAN DELIVERY, CURRENTLY PREGNANT: ICD-10-CM

## 2022-04-12 DIAGNOSIS — Z3A.12 12 WEEKS GESTATION OF PREGNANCY: Primary | ICD-10-CM

## 2022-04-12 DIAGNOSIS — O28.3 INCREASED NUCHAL TRANSLUCENCY SPACE ON FETAL ULTRASOUND: Primary | ICD-10-CM

## 2022-04-12 DIAGNOSIS — O99.210 OBESITY IN PREGNANCY: ICD-10-CM

## 2022-04-12 DIAGNOSIS — Z87.42 HISTORY OF ABNORMAL CERVICAL PAP SMEAR: ICD-10-CM

## 2022-04-12 DIAGNOSIS — Z13.79 GENETIC SCREENING: ICD-10-CM

## 2022-04-12 PROBLEM — Z86.59 HISTORY OF POSTPARTUM DEPRESSION, CURRENTLY PREGNANT IN FIRST TRIMESTER: Status: ACTIVE | Noted: 2022-04-12

## 2022-04-12 PROBLEM — O99.891 HISTORY OF POSTPARTUM DEPRESSION, CURRENTLY PREGNANT IN FIRST TRIMESTER: Status: ACTIVE | Noted: 2022-04-12

## 2022-04-12 LAB
SL AMB  POCT GLUCOSE, UA: NORMAL
SL AMB POCT URINE PROTEIN: NORMAL

## 2022-04-12 PROCEDURE — 76813 OB US NUCHAL MEAS 1 GEST: CPT | Performed by: OBSTETRICS & GYNECOLOGY

## 2022-04-12 PROCEDURE — 87591 N.GONORRHOEAE DNA AMP PROB: CPT | Performed by: PHYSICIAN ASSISTANT

## 2022-04-12 PROCEDURE — 76817 TRANSVAGINAL US OBSTETRIC: CPT | Performed by: OBSTETRICS & GYNECOLOGY

## 2022-04-12 PROCEDURE — 87491 CHLMYD TRACH DNA AMP PROBE: CPT | Performed by: PHYSICIAN ASSISTANT

## 2022-04-12 PROCEDURE — 76801 OB US < 14 WKS SINGLE FETUS: CPT | Performed by: OBSTETRICS & GYNECOLOGY

## 2022-04-12 PROCEDURE — 99213 OFFICE O/P EST LOW 20 MIN: CPT | Performed by: PHYSICIAN ASSISTANT

## 2022-04-12 PROCEDURE — G0476 HPV COMBO ASSAY CA SCREEN: HCPCS | Performed by: PHYSICIAN ASSISTANT

## 2022-04-12 PROCEDURE — G0145 SCR C/V CYTO,THINLAYER,RESCR: HCPCS | Performed by: PHYSICIAN ASSISTANT

## 2022-04-12 PROCEDURE — 99214 OFFICE O/P EST MOD 30 MIN: CPT | Performed by: OBSTETRICS & GYNECOLOGY

## 2022-04-12 PROCEDURE — 76813 OB US NUCHAL MEAS 1 GEST: CPT | Performed by: NURSE PRACTITIONER

## 2022-04-12 NOTE — PROGRESS NOTES
Patient is here for Initial PN 1 visit  She denies any complaints  12wks/6days  KACEY: 10/19/22  Last pap: 4/14/21 Pap: neg/HPV: pos- other HPV  GC/CH collected:yes  PN 1 labs completed    Flu vaccine: no  Covid vaccine: no  Urine: protein: neg/glucose: neg  Blue information packet given: yes  Nuchal: 4/12/22  Level II US scheduled: 6/2/22

## 2022-04-12 NOTE — LETTER
2022     Sheila Taylor [address]    Patient: Karla Chang   YOB: 1985   Date of Visit: 2022       Dear Dr Short How: Thank you for referring Shannon Barnes to me for evaluation  Below are my notes for this consultation  If you have questions, please do not hesitate to call me  I look forward to following your patient along with you  Sincerely,        Nayana Bradford MD        CC: No Recipients  Mehran JOSH Hernandez  2022  4:25 PM  Sign when Signing Visit  OFFICE CONSULT  Referring physician:   Sterling Gonzalez Md  [address]  [facility],  [address]      Dear Dr Deja Padilla      Thank you for requesting a  consultation on your patient Ms Karla Chang for the following indications:  Genetic screening    History  Medications: Prenatal Vitamins  Allergies to medications:NKDA  Past medical history: post partum depression  Past surgical history: denies   Past obstetrical history:   # 1  TAB  # 2 2004 SAB  # 3 2005 term/female/vaginal/PROM  # 4 2019 female/term/C/S for /macrosomia 9lb 6 oz  She was worked up for GDM at 40 weeks and a 3hr ogtt returned at 89/183/172/89 with 2 abnormal numbers which would give the diagnosis of gestional diabetes  She delivered 11 days later  # 5 current      Social history: history of alcohol abuse- over 6 years ago  First generation family history: noncontributory     Ultrasound findings: The ultrasound shows a fetus concordant with dates  The nasal bone view was difficult to see secondary to fetal position  The nuchal translucency appears abnormal   With the fetus on its back the largest measurement was 6 2 millimeters but the views were very limited  We attempted a transvaginal scan twice and then 2 further abdominal scans at different times with hopes that the fetus would move to improve our visualization    At the end of the study the largest repeat measurement of the NT was 5 1 millimeters  The patient was informed of the findings and counseled about the limitations of the exam in detecting all forms of fetal congenital abnormalities  She does not report any vaginal bleeding or uterine cramping or contractions  Specific counseling was provided on the following problems: We discussed that the thickened nuchal translucency can be associated with a 33-50 percent risk for aneuploidy  This can also be associated with findings of a heart defect later in pregnancy or a defect in the development of the lymph system called a cystic hygroma  As long as the fetal karyotype returns as normal and future ultrasounds showed resolution of this thickened nuchal translucency measurements and no other malformations are detected including heart defects than 98 percent of babies will be normal at birth  We discussed the options for genetic screening which include invasive testing on the fetal placenta or on fetal skin cells within the amniotic fluid and compared this to noninvasive testing which includes cell free DNA screening and the sequential screen  We reviewed the risks, the benefits and the limitations of each  In the end patient chose to complete invasive testing and will be rescheduled for a follow-up visit for an amniocentesis  Future tests recommended:  1  She will be set up for an amniocentesis  Testing will include a fish test, karyotype with reflex to microarray with reflex to [place] syndrome  Spina bifida can also be screened for at the same time  3    She needs to complete her early 1 hour Glucola screen  Future ultrasounds ordered today:   1  Fetal Level II ultrasound imaging is recommended for early anatomy at 16 weeks and then an in-depth scan at 19-20 weeks' gestation    2    Lastly a fetal echo can be planned around 24 weeks      The face to face time, in addition to time spent discussing ultrasound results, was approximately 60 minutes, greater than 50% of which was spent during counseling and coordination of care    Severiano Hughs, MD

## 2022-04-12 NOTE — Clinical Note
Thickened NT  Please schedule amniocentesis for 2-3 weeks / early anatomy on same day and offer genetic counselor appt  I would like to do if I have any openings  If not let me know so I can promote one of my partners who is scheduled to do it       Thanks  Areli Moraes

## 2022-04-12 NOTE — PATIENT INSTRUCTIONS
Thank you for choosing us for your  care today  If you have any questions about your ultrasound or care, please do not hesitate to contact us or your primary obstetrician  Some general instructions for your pregnancy are:     Protect against coronavirus: get vaccinated - pregnant women are increased risk of severe COVID  Notify your primary care doctor if you have any symptoms   Exercise: Aim for 22 minutes per day (150 minutes per week) of regular exercise  Walking is great!  Nutrition: aim for calcium-rich and iron-rich foods as well as healthy sources of protein   Learn about Preeclampsia: preeclampsia is a common, serious high blood pressure complication in pregnancy  A blood pressure of 639FIUM (systolic or top number) or 48WMNE (diastolic or bottom number) is not normal and needs evaluation by your doctor  Aspirin is sometimes prescribed in early pregnancy to prevent preeclampsia in women with risk factors - ask your obstetrician if you should be on this medication   If you smoke, try to reduce how many cigarettes you smoke or try to quit completely  Do not vape   Other warning signs to watch out for in pregnancy or postpartum: chest pain, obstructed breathing or shortness of breath, seizures, thoughts of hurting yourself or your baby, bleeding, a painful or swollen leg, fever, or headache (see [facility] POST-BIRTH Warning Signs campaign)  If these happen call 911  Itching is also not normal in pregnancy and if you experience this, especially over your hands and feet, potentially worse at night, notify your doctors

## 2022-04-12 NOTE — ASSESSMENT & PLAN NOTE
Pap hx:  8/2014 NILM/Neg  9/2017 ASCUS / negative  4/14/2021 NILM / HPV Other +  Colposcopy 4/23/2021 normal  Repeat pap smear collected 4/12 -- results pending

## 2022-04-12 NOTE — ASSESSMENT & PLAN NOTE
Asif Santiago is here for her first prenatal visit  Age: 43 y o  LMP: No LMP recorded (lmp unknown)  Patient is pregnant  Gestational age 14w9d based on early US   5  Para          Previous C Section: Yes   She denies nausea and vomiting  She has had no vaginal bleeding  Patients complains of fatigue  She  is planning consultation with maternal fetal medicine for a sequential screen and genetic screening  Allergies: Patient has no known allergies  Medication use :   Current Outpatient Medications   Medication Sig Dispense Refill    Prenatal Vit-Fe Fumarate-FA (PRENATAL COMPLETE PO) Take 1 tablet by mouth daily         No current facility-administered medications for this visit  She is a non-smoker  In this pregnancy her  medical history is significant for history of alcoholism (sober x 6 years)  Her obstetrical, medical, surgical and family history was reviewed  #1 -- 2002 AB 5 wks  #2 -- 2004 SAB 8 wks  #3 -- 2005  at 41 wk, IOL, female 7lbs 7 oz ,    PROM  #4 -- 2019 PLTCS 37wk, LGA female 9 lbs 6oz   LGA, GDMA1  #5 -- current    Her physical exam was within normal limits     Patient appears well and is not in distress  Neck is supple without masses  Breasts are symmetrical without mass, tenderness, nipple discharge, skin changes or adenopathy  Abdomen is soft and nontender without masses  Gravid  External genitals are normal without lesions or rashes  Urethral meatus and urethra are normal  Bladder is normal to palpation  Vagina is normal without discharge or bleeding  Cervix is normal without discharge or lesion  Parous  Uterus is normal, mobile, nontender without palpable mass  Adnexa are normal, nontender, without palpable mass  Pap smear, GC/CT obtained         Discussed as well during this visit was diet, prenatal vitamins, prenatal visits, lab testing, breast feeding, vaccinations, maternal fetal medicine consultations, prevention of exposure to infectious diseases and toxic chemicals, lifestyle  She does care for chickens and house pets  We did review adverse effects with exposure to infectious disease through animals  Verbalized understanding of information provided  Risk factors for this pregnancy include:   AMA  Obesity, BMI 31  Hx of alcoholism,  Hx of LGA  Hx of GDMA1  Hx of C section  Hx of PPD -- requiring medication    ASSESSMENT  Early pregnancy at [address]  1 - Gonorrhea and Chlamydia cultures obtained today  2 - Pap smear with HPV co-testing done today  3 - Prenatal labs reviewed -- unremarkable   Still needs to obtain early 1 hr    4 - Genetic screening scheduled for 4/12  5 - RTO in 4 weeks for routine PN visit

## 2022-04-12 NOTE — PROGRESS NOTES
Patient chose to use [facility] lab and was given lab slip for the Noninvasive Prenatal Screen -[facility] Qnatal Advanced  Blood sample is drawn at [facility] and online appointment scheduling and lab locations can be found @ www  [employer]     Qnatal  card given, patient instructed how to check her out- of -pocket responsibility @ [employer]  Patient aware that  is provided by third party and is only an estimate of cost ,not a guarantee  For definitive cost, patient encouraged to call her insurance provider- insurance phone # located on the back of her ID card  Some Insurance plans may require prior authorization before blood is drawn  Patient instructed to check with her plan before she goes to lab and notify [facility]  Patient made aware she may be responsible for full cost of test if lab drawn before prior authorization obtained  Insurance Authorization may take up to 14 business days, patient made aware insurance authorization does not mean test is covered 100%  Information on how to check OOP NIPT coverage/ check if a prior authorization needed for NIPT was also provided to patient during the appointment scheduling of her [facility] NT appt  Patient made aware Qnatal results typically are available in 7-10 business days after blood draw and to call [facility] if she does not receive notification of her results  Patient made aware that viewing Qnatal results online will reveal gender  Patient verbalized understanding of all instructions and no questions at this time  Patient aware she needs prior auth and needs to wait to hear from [facility] that prior [place] has been completed before having lab drawn  She is aware this could take 7-14 days

## 2022-04-12 NOTE — PATIENT INSTRUCTIONS
Pregnancy at 11 to 14 Weeks   AMBULATORY CARE:   Changes happening to your body: You are now at the end of your first trimester and entering your second trimester  Morning sickness usually goes away by this time  You may have other symptoms such as fatigue, frequent urination, and headaches  You may have gained 2 to 4 pounds by now  Seek care immediately if:   · You have pain or cramping in your abdomen or low back  · You have heavy vaginal bleeding or clotting  · You pass material that looks like tissue or large clots  Collect the material and bring it with you  Call your doctor or obstetrician if:   · You cannot keep food or drinks down, and you are losing weight  · You have light vaginal bleeding  · You have chills or a fever  · You have vaginal itching, burning, or pain  · You have yellow, green, white, or foul-smelling vaginal discharge  · You have pain or burning when you urinate, less urine than usual, or pink or bloody urine  · You have questions or concerns about your condition or care  How to care for yourself at this stage of your pregnancy:       · Get plenty of rest   You may feel more tired than normal  You may need to take naps or go to bed earlier  · Manage nausea and vomiting  Avoid fatty and spicy foods  Eat small meals throughout the day instead of large meals  Jovita may help to decrease nausea  Ask your healthcare provider about other ways of decreasing nausea and vomiting  · Eat a variety of healthy foods  Healthy foods include fruits, vegetables, whole-grain breads, low-fat dairy foods, beans, lean meats, and fish  Drink liquids as directed  Ask how much liquid to drink each day and which liquids are best for you  Limit caffeine to less than 200 milligrams each day  Limit your intake of fish to 2 servings each week  Choose fish low in mercury such as canned light tuna, shrimp, salmon, cod, or tilapia   Do not  eat fish high in mercury such as swordfish, tilefish, mikki mackerel, and shark  · Take prenatal vitamins as directed  Your need for certain vitamins and minerals, such as folic acid, increases during pregnancy  Prenatal vitamins provide some of the extra vitamins and minerals you need  Prenatal vitamins may also help to decrease the risk of certain birth defects  · Do not smoke  Smoking increases your risk of a miscarriage and other health problems during your pregnancy  Smoking can cause your baby to be born too early or weigh less at birth  Ask your healthcare provider for information if you need help quitting  · Do not drink alcohol  Alcohol passes from your body to your baby through the placenta  It can affect your baby's brain development and cause fetal alcohol syndrome (FAS)  FAS is a group of conditions that causes mental, behavior, and growth problems  · Talk to your healthcare provider before you take any medicines  Many medicines may harm your baby if you take them when you are pregnant  Do not take any medicines, vitamins, herbs, or supplements without first talking to your healthcare provider  Never use illegal or street drugs (such as marijuana or cocaine) while you are pregnant  Safety tips during pregnancy:   · Avoid hot tubs and saunas  Do not use a hot tub or sauna while you are pregnant, especially during your first trimester  Hot tubs and saunas may raise your baby's temperature and increase the risk of birth defects  · Avoid toxoplasmosis  This is an infection caused by eating raw meat or being around infected cat feces  It can cause birth defects, miscarriages, and other problems  Wash your hands after you touch raw meat  Make sure any meat is well-cooked before you eat it  Avoid raw eggs and unpasteurized milk  Use gloves or ask someone else to clean your cat's litter box while you are pregnant  Changes happening with your baby: Your baby has fully formed fingernails and toenails   Your baby's heartbeat can now be heard  Ask your healthcare provider if you can listen to your baby's heartbeat  By week 14, your baby is over 4 inches long from the top of the head to the rump (baby's bottom)  Your baby weighs over 3 ounces  Prenatal care:  Prenatal care is a series of visits with your healthcare provider throughout your pregnancy  During the first 28 weeks of your pregnancy, you will see your healthcare provider 1 time each month  Prenatal care can help prevent problems during pregnancy and childbirth  Your healthcare provider will check your blood pressure and weight  Your baby's heart rate will also be checked  You may also need the following at some visits:  · A pelvic exam  allows your healthcare provider to see your cervix (the bottom part of your uterus)  Your healthcare provider will use a speculum to open your vagina  He or she will check the size and shape of your uterus  · Blood tests  may be done to check for any of the following:    ? Gestational diabetes or anemia (low iron level)    ? Blood type or Rh factor, or certain birth defects    ? Immunity to certain diseases, such as chickenpox or rubella    ? An infection, such as a sexually transmitted infection, HIV, or hepatitis B    · Hepatitis B  may need to be prevented or treated  Hepatitis B is inflammation of the liver caused by the hepatitis B virus (HBV)  HBV can spread from a mother to her baby during delivery  You will be checked for HBV as early as possible in the first trimester of each pregnancy  You need the test even if you received the hepatitis B vaccine or were tested before  You may need to have an HBV infection treated before you give birth  · Urine tests  may also be done to check for sugar and protein  These can be signs of gestational diabetes or preeclampsia  Urine tests may also be done to check for signs of infection  · A fetal ultrasound  shows pictures of your baby inside your uterus   The pictures are used to check your baby's development, movement, and position  · Genetic disorder screening tests  may be offered to you  These tests check your baby's risk for genetic disorders such as Down syndrome  A screening test includes a blood test and ultrasound  Follow up with your doctor or obstetrician as directed:  Go to all prenatal visits  Write down your questions so you remember to ask them during your visits  © Copyright [employer] 2022 Information is for End User's use only and may not be sold, redistributed or otherwise used for commercial purposes  All illustrations and images included in CareNotes® are the copyrighted property of A D A M , Inc  or [address] [place], [place] [address]b[place]   The above information is an  only  It is not intended as medical advice for individual conditions or treatments  Talk to your doctor, nurse or pharmacist before following any medical regimen to see if it is safe and effective for you

## 2022-04-12 NOTE — ASSESSMENT & PLAN NOTE
We reviewed goal WG of 11-20 lbs  Reviewed diet and activity  Discussed initiation of LDASA - 162 mg daily, begun between 12-16 weeks and continued to 36 weeks gestation for prevention of preeclampsia given BMI and AMA  Agreeable to start

## 2022-04-12 NOTE — ASSESSMENT & PLAN NOTE
PPD after delivery in 2019  Controlled on zoloft  Self tapered sometime during first year  Currently feeling well  Denies depression sx    Some chronic anxiety, but reports mild  Feels well supported at home  Does not feel she needs to see a counselor at this time

## 2022-04-12 NOTE — PROGRESS NOTES
OFFICE CONSULT  Referring physician:   Van Rebolledo Md  [address]  [facility],  [address]      Dear Dr Zuly Herrmann      Thank you for requesting a  consultation on your patient Ms Elly Blanton for the following indications:  Genetic screening    History  Medications: Prenatal Vitamins  Allergies to medications:NKDA  Past medical history: post partum depression  Past surgical history: denies   Past obstetrical history:   # 1 2002 TAB  # 2 2004 SAB  # 3 2005 term/female/vaginal/PROM  # 4 2019 female/term/C/S for /macrosomia 9lb 6 oz  She was worked up for GDM at 40 weeks and a 3hr ogtt returned at 89/183/172/89 with 2 abnormal numbers which would give the diagnosis of gestional diabetes  She delivered 11 days later  # 5 current      Social history: history of alcohol abuse- over 6 years ago  First generation family history: noncontributory     Ultrasound findings: The ultrasound shows a fetus concordant with dates  The nasal bone view was difficult to see secondary to fetal position  The nuchal translucency appears abnormal   With the fetus on its back the largest measurement was 6 2 millimeters but the views were very limited  We attempted a transvaginal scan twice and then 2 further abdominal scans at different times with hopes that the fetus would move to improve our visualization  At the end of the study the largest repeat measurement of the NT was 5 1 millimeters  The patient was informed of the findings and counseled about the limitations of the exam in detecting all forms of fetal congenital abnormalities  She does not report any vaginal bleeding or uterine cramping or contractions  Specific counseling was provided on the following problems: We discussed that the thickened nuchal translucency can be associated with a 33-50 percent risk for aneuploidy    This can also be associated with findings of a heart defect later in pregnancy or a defect in the development of the lymph system called a cystic hygroma  As long as the fetal karyotype returns as normal and future ultrasounds showed resolution of this thickened nuchal translucency measurements and no other malformations are detected including heart defects than 98 percent of babies will be normal at birth  We discussed the options for genetic screening which include invasive testing on the fetal placenta or on fetal skin cells within the amniotic fluid and compared this to noninvasive testing which includes cell free DNA screening and the sequential screen  We reviewed the risks, the benefits and the limitations of each  In the end patient chose to complete invasive testing and will be rescheduled for a follow-up visit for an amniocentesis  Future tests recommended:  1  She will be set up for an amniocentesis  Testing will include a fish test, karyotype with reflex to microarray with reflex to Jessica syndrome  Spina bifida can also be screened for at the same time  3    She needs to complete her early 1 hour Glucola screen  Future ultrasounds ordered today:   1  Fetal Level II ultrasound imaging is recommended for early anatomy at 16 weeks and then an in-depth scan at 19-20 weeks' gestation    2    Lastly a fetal echo can be planned around 24 weeks      The face to face time, in addition to time spent discussing ultrasound results, was approximately 60 minutes, greater than 50% of which was spent during counseling and coordination of care    Monroe Glover MD

## 2022-04-12 NOTE — PROGRESS NOTES
Problem List Items Addressed This Visit        Other    Advanced maternal age in multigravida, first trimester     Planning to obtain 1 hr next week when has childcare  Reviewed LDASA -- see under obesity  Third trimester growth  History of  delivery, currently pregnant     Delivered at 39w0d secondary to LGA, 9lbs 6 oz  Desires repeat C section and tubal           History of premature rupture of membranes in previous pregnancy, currently pregnant in first trimester    Obesity in pregnancy     We reviewed goal WG of 11-20 lbs  Reviewed diet and activity  Discussed initiation of LDASA - 162 mg daily, begun between 12-16 weeks and continued to 36 weeks gestation for prevention of preeclampsia given BMI and AMA  Agreeable to start  12 weeks gestation of pregnancy - Primary     Dede Duran is here for her first prenatal visit  Age: 43 y o  LMP: No LMP recorded (lmp unknown)  Patient is pregnant  Gestational age 14w9d based on early US   5  Para          Previous C Section: Yes   She denies nausea and vomiting  She has had no vaginal bleeding  Patients complains of fatigue  She  is planning consultation with maternal fetal medicine for a sequential screen and genetic screening  Allergies: Patient has no known allergies  Medication use :   Current Outpatient Medications   Medication Sig Dispense Refill    Prenatal Vit-Fe Fumarate-FA (PRENATAL COMPLETE PO) Take 1 tablet by mouth daily         No current facility-administered medications for this visit  She is a non-smoker  In this pregnancy her  medical history is significant for history of alcoholism (sober x 6 years)  Her obstetrical, medical, surgical and family history was reviewed    #1 --  AB 5 wks  #2 -- 2004 SAB 8 wks  #3 --   at 41 wk, IOL, female 7lbs 7 oz ,    PROM  #4 -- 2019 PLTCS 37wk, LGA female 9 lbs 6oz   LGA, GDMA1  #5 -- current    Her physical exam was within normal limits Patient appears well and is not in distress  Neck is supple without masses  Breasts are symmetrical without mass, tenderness, nipple discharge, skin changes or adenopathy  Abdomen is soft and nontender without masses  Gravid  External genitals are normal without lesions or rashes  Urethral meatus and urethra are normal  Bladder is normal to palpation  Vagina is normal without discharge or bleeding  Cervix is normal without discharge or lesion  Parous  Uterus is normal, mobile, nontender without palpable mass  Adnexa are normal, nontender, without palpable mass  Pap smear, GC/CT obtained      Discussed as well during this visit was diet, prenatal vitamins, prenatal visits, lab testing, breast feeding, vaccinations, maternal fetal medicine consultations, prevention of exposure to infectious diseases and toxic chemicals, lifestyle  She does care for chickens and house pets  We did review adverse effects with exposure to infectious disease through animals  Verbalized understanding of information provided  Risk factors for this pregnancy include:   AMA  Obesity, BMI 31  Hx of alcoholism,  Hx of LGA  Hx of GDMA1  Hx of C section  Hx of PPD -- requiring medication    ASSESSMENT  Early pregnancy at [address]  1 - Gonorrhea and Chlamydia cultures obtained today  2 - Pap smear with HPV co-testing done today  3 - Prenatal labs reviewed -- unremarkable  Still needs to obtain early 1 hr    4 - Genetic screening scheduled for 4/12  5 - RTO in 4 weeks for routine PN visit         Relevant Orders    POCT urine dip (Completed)    Chlamydia/GC amplified DNA by PCR    History of gestational diabetes in prior pregnancy, currently pregnant in first trimester     GDM A1 in last pregnancy  Has not yet obtained early 1 hr glucose  Planning to go next week when she has childcare  History of alcoholism ([facility] [address] )     Sober x 6 years            History of abnormal cervical Pap smear     Pap hx:  8/2014 NILM/Neg  9/2017 ASCUS / negative  4/14/2021 NILM / HPV Other +  Colposcopy 4/23/2021 normal  Repeat pap smear collected 4/12 -- results pending            Relevant Orders    Liquid-based pap, screening

## 2022-04-12 NOTE — ASSESSMENT & PLAN NOTE
Planning to obtain 1 hr next week when has childcare  Reviewed LDASA -- see under obesity  Third trimester growth

## 2022-04-13 ENCOUNTER — TELEPHONE (OUTPATIENT)
Dept: PERINATAL CARE | Facility: CLINIC | Age: 37
End: 2022-04-13

## 2022-04-13 ENCOUNTER — TELEPHONE (OUTPATIENT)
Dept: PERINATAL CARE | Facility: OTHER | Age: 37
End: 2022-04-13

## 2022-04-13 PROBLEM — O28.3 INCREASED NUCHAL TRANSLUCENCY SPACE ON FETAL ULTRASOUND: Status: ACTIVE | Noted: 2022-04-12

## 2022-04-13 PROBLEM — O09.299 HISTORY OF MACROSOMIA IN INFANT IN PRIOR PREGNANCY, CURRENTLY PREGNANT: Status: ACTIVE | Noted: 2022-04-13

## 2022-04-13 LAB
HPV HR 12 DNA CVX QL NAA+PROBE: NEGATIVE
HPV16 DNA CVX QL NAA+PROBE: NEGATIVE
HPV18 DNA CVX QL NAA+PROBE: NEGATIVE

## 2022-04-13 NOTE — TELEPHONE ENCOUNTER
Called patient to schedule follow up appointments, per staff message:    Chace Park MD - [address] [facility]  MessageReceived: Today  Kim Carter RN  P  [facility] [employer]; Tae Campos RN    Future ultrasounds ordered today:   1  Fetal Level II ultrasound imaging is recommended for early anatomy at 16 weeks and then an in-depth scan at 19-20 weeks' gestation  2    Lastly a fetal echo can be planned around 24 weeks    Spoke with patient and scheduled appointment - [facility]AT[facility]Y  5/3/22  10:00  VANG  We don't have  schedule for [facility] Fetal Echo yet, I will call pt to schedule when [employer] is available  OK per pt

## 2022-04-13 NOTE — TELEPHONE ENCOUNTER
Per [facility] provider Dr Mckeon Serve notes 04/11/22- patient desires amniocentesis  Phone call to patient and discussed dating for amnio scheduling, patients first choice for [facility] site is [place] but willing to travel to any [facility] site if needed  Offered virtual appt with [facility] Genetic Counselor  Patient agreeable to virtual visit 04/19/22 @ 0800 with Dino Henriquez and [facility] will follow up with amnio scheduling after this appt

## 2022-04-14 LAB
C TRACH DNA SPEC QL NAA+PROBE: NEGATIVE
N GONORRHOEA DNA SPEC QL NAA+PROBE: NEGATIVE

## 2022-04-15 ENCOUNTER — TELEPHONE (OUTPATIENT)
Dept: PERINATAL CARE | Facility: OTHER | Age: 37
End: 2022-04-15

## 2022-04-15 ENCOUNTER — DOCUMENTATION (OUTPATIENT)
Dept: PERINATAL CARE | Facility: CLINIC | Age: 37
End: 2022-04-15

## 2022-04-15 NOTE — TELEPHONE ENCOUNTER
Called patient to schedule follow up appointment:  FETAL ECHO MFM - approx  6/29/22    Spoke with patient and scheduled appointment - 6/29/22  8:45  KHLOE

## 2022-04-15 NOTE — PROGRESS NOTES
Request for prior authorization for NIPT ([id number]) submitted with [facility] Akil Martell)  Pending prior authorization # [id number]  Clinical information faxed to [facility] at [phone] with confirmation receipt obtained

## 2022-04-18 ENCOUNTER — TELEPHONE (OUTPATIENT)
Dept: PERINATAL CARE | Facility: OTHER | Age: 37
End: 2022-04-18

## 2022-04-18 ENCOUNTER — TELEPHONE (OUTPATIENT)
Dept: PERINATAL CARE | Facility: CLINIC | Age: 37
End: 2022-04-18

## 2022-04-18 NOTE — TELEPHONE ENCOUNTER
Spoke with pt and informed her insurance Authorization for NIPT genetic screening has been approved  Auth # is [id number] with dates of service 4/15/2022-7/14/2022  Insurance Authorization is not a guarantee of payment and final determination is based on your member benefits, appropriateness of service provided and eligibility at time of services rendered and claim received  Please check if you have any OOP lab co-pay or deductible prior to completing screening  Pt receptive to information and declines questions at this time

## 2022-04-18 NOTE — TELEPHONE ENCOUNTER
Left message on voicemail regarding virtual visit for tomorrow with Juliano Myrick  Appointment changed from 8 am to 815 am   Lenore in a 7 am meeting

## 2022-04-19 ENCOUNTER — TELEPHONE (OUTPATIENT)
Dept: PERINATAL CARE | Facility: CLINIC | Age: 37
End: 2022-04-19

## 2022-04-19 ENCOUNTER — TELEMEDICINE (OUTPATIENT)
Dept: PERINATAL CARE | Facility: CLINIC | Age: 37
End: 2022-04-19

## 2022-04-19 DIAGNOSIS — Z31.5 ENCOUNTER FOR PROCREATIVE GENETIC COUNSELING: ICD-10-CM

## 2022-04-19 DIAGNOSIS — O09.529 ANTEPARTUM MULTIGRAVIDA OF ADVANCED MATERNAL AGE: ICD-10-CM

## 2022-04-19 DIAGNOSIS — O35.9XX0 FETAL ABNORMALITY AFFECTING MANAGEMENT OF MOTHER, SINGLE OR UNSPECIFIED FETUS: Primary | ICD-10-CM

## 2022-04-19 PROCEDURE — NC001 PR NO CHARGE: Performed by: GENETIC COUNSELOR, MS

## 2022-04-19 NOTE — TELEPHONE ENCOUNTER
Patient confirmed early anatomy w/amnio procedure scheduled @ [facility] on Thursday 05/05/22 w/ Dr Zonia Whitney  Patient states her  Gopal Messing will accompany her  Blood type + and HIV/Hepatitis negative  Briefly reviewed procedure and after care instructions  Patient verbalized understanding of all info and no questions at this time
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
## 2022-04-20 LAB
LAB AP GYN PRIMARY INTERPRETATION: NORMAL
Lab: NORMAL
PATH INTERP SPEC-IMP: NORMAL

## 2022-04-20 NOTE — RESULT ENCOUNTER NOTE
Will you call Danny Blum  Pap was normal, but showed yeast infection  She can complete OTC 7-day monistat if symptomatic      Thanks,   Cash Cruz

## 2022-04-21 ENCOUNTER — TELEPHONE (OUTPATIENT)
Dept: OBGYN CLINIC | Facility: CLINIC | Age: 37
End: 2022-04-21

## 2022-04-21 ENCOUNTER — TELEPHONE (OUTPATIENT)
Dept: PERINATAL CARE | Facility: CLINIC | Age: 37
End: 2022-04-21

## 2022-04-21 NOTE — TELEPHONE ENCOUNTER
Left patient a message that her M appointment 5/3/22 has been cancelled  The 5/5/22 appointment replaces that visit  The patient has been instructed to please call us back at [phone] with any questions or concerns

## 2022-04-21 NOTE — TELEPHONE ENCOUNTER
----- Message from Richie Gallo PA-C sent at 4/20/2022  3:30 PM EDT -----  Will you call Jamar Mary  Pap was normal, but showed yeast infection  She can complete OTC 7-day monistat if symptomatic      Thanks,   Kelley Mata

## 2022-04-28 LAB
# FETUSES US: 1
CFDNA.FET/TOTAL PLAS.CFDNA: ABNORMAL
FET CHR 13 TS PLAS.CFDNA QL: NEGATIVE
FET CHR 18 TS PLAS.CFDNA QL: NEGATIVE
FET CHR 21 TS PLAS.CFDNA QL: POSITIVE
FET CHR X + Y ANEUP PLAS.CFDNA QL: ABNORMAL
FET CHROM X + Y ANEUP CFDNA IMP: ABNORMAL
FET Y CHROM PLAS.CFDNA QL: NOT DETECTED
FET Y CHROM PLAS.CFDNA: ABNORMAL
GA (DAYS): 6 D
GA (WEEKS): 13 WK
MICRODELETION SYND BLD/T FISH: ABNORMAL
REF LAB TEST METHOD: ABNORMAL
SERVICE CMNT-IMP: ABNORMAL
SERVICE CMNT-IMP: ABNORMAL
SL AMB ABNORMAL MSS?: ABNORMAL
SL AMB ABNORMAL US?: ABNORMAL
SL AMB ADVANCED MATERNAL AGE?: YES
SL AMB MICRODELETION INTERP: ABNORMAL
SL AMB MICRODELETION: ABNORMAL
SL AMB PERSONAL/FAM HISTORY?: ABNORMAL
SL AMB SPECIFICATIONS: ABNORMAL

## 2022-04-29 ENCOUNTER — TELEPHONE (OUTPATIENT)
Dept: PERINATAL CARE | Facility: CLINIC | Age: 37
End: 2022-04-29

## 2022-04-29 ENCOUNTER — DOCUMENTATION (OUTPATIENT)
Dept: PERINATAL CARE | Facility: CLINIC | Age: 37
End: 2022-04-29

## 2022-04-29 NOTE — TELEPHONE ENCOUNTER
Patient called back and confirmed date of birth  Reviewed abnormal QNatal result for Down syndrome  Discussed PPV is about 98% and that amnio is available for confirmation  Patient stated that she has thought about the course of the pregnancy and has elected pregnancy termination  Emotional support provided  Patient does not feel the need for an amniocentesis and asked to cancel the procedure  She already contacted [facility] and has an appointment with them on Friday  We will cancel her MFM appointments and I encourage her to reach out to her OB office as well to let them know  Patient stated she was grateful for the support and will call with any questions or concerns

## 2022-04-29 NOTE — PROGRESS NOTES
Received a vmm from [employer] at [phone] on 4/29/22  Stating pt's Qnatal results are positive - Trisomy  21  Specimen number of #[id number] was provided as well phone number  [phone]  Spoke with Katharine Garcia at [facility] requesting pt's Radha Grout results, MFM fax number of was provided [phone]  This message was routed to [place] [address]

## 2022-04-29 NOTE — TELEPHONE ENCOUNTER
Patient viewed abnormal QNatal result (positive for Down syndrome) in her MyChart  Called patient and left message letting her know that I see she viewed the result and I would like to touch base with her to review further, answer any questions, etc   Provided genetic counseling phone number for her to call back

## 2022-05-01 NOTE — RESULT ENCOUNTER NOTE
Rubi or Lenore  please review this result and contact the patient, and please notify Dr Jada Gunter  Thank you    Lee Peter

## 2022-05-09 PROBLEM — Z3A.16 16 WEEKS GESTATION OF PREGNANCY: Status: ACTIVE | Noted: 2022-04-12

## 2022-05-10 ENCOUNTER — TELEPHONE (OUTPATIENT)
Dept: OBGYN CLINIC | Facility: MEDICAL CENTER | Age: 37
End: 2022-05-10

## 2022-05-10 NOTE — TELEPHONE ENCOUNTER
Patient was looking to schedule follow up with [facility]  Currently no openings with MD's 3-4 weeks from now  Suggested completed follow up with [facility]  Left message for patient to call back if she still prefers to schedule with us

## 2022-05-23 DIAGNOSIS — Z30.41 ENCOUNTER FOR SURVEILLANCE OF CONTRACEPTIVE PILLS: Primary | ICD-10-CM

## 2022-05-23 RX ORDER — ACETAMINOPHEN AND CODEINE PHOSPHATE 120; 12 MG/5ML; MG/5ML
1 SOLUTION ORAL DAILY
Qty: 90 TABLET | Refills: 0 | Status: SHIPPED | OUTPATIENT
Start: 2022-05-23

## 2022-05-23 NOTE — TELEPHONE ENCOUNTER
Pt had a termination she said due to complications, she is asking for a refill on her ocp's will  You fill

## 2022-08-15 DIAGNOSIS — Z30.41 ENCOUNTER FOR SURVEILLANCE OF CONTRACEPTIVE PILLS: ICD-10-CM

## 2022-08-15 RX ORDER — ACETAMINOPHEN AND CODEINE PHOSPHATE 120; 12 MG/5ML; MG/5ML
1 SOLUTION ORAL DAILY
Qty: 84 TABLET | Refills: 2 | Status: SHIPPED | OUTPATIENT
Start: 2022-08-15

## 2022-09-23 NOTE — PROGRESS NOTES
Patient refuses flu vaccine Problem: PAIN - ADULT  Goal: Verbalizes/displays adequate comfort level or baseline comfort level  Description: Interventions:  - Encourage patient to monitor pain and request assistance  - Assess pain using appropriate pain scale  - Administer analgesics based on type and severity of pain and evaluate response  - Implement non-pharmacological measures as appropriate and evaluate response  - Consider cultural and social influences on pain and pain management  - Notify physician/advanced practitioner if interventions unsuccessful or patient reports new pain  Outcome: Progressing     Problem: Knowledge Deficit  Goal: Patient/family/caregiver demonstrates understanding of disease process, treatment plan, medications, and discharge instructions  Description: Complete learning assessment and assess knowledge base    Interventions:  - Provide teaching at level of understanding  - Provide teaching via preferred learning methods  Outcome: Progressing     Problem: RESPIRATORY - ADULT  Goal: Achieves optimal ventilation and oxygenation  Description: INTERVENTIONS:  - Assess for changes in respiratory status  - Assess for changes in mentation and behavior  - Position to facilitate oxygenation and minimize respiratory effort  - Oxygen administered by appropriate delivery if ordered  - Initiate smoking cessation education as indicated  - Encourage broncho-pulmonary hygiene including cough, deep breathe, Incentive Spirometry  - Assess the need for suctioning and aspirate as needed  - Assess and instruct to report SOB or any respiratory difficulty  - Respiratory Therapy support as indicated  Outcome: Progressing

## 2023-08-29 NOTE — PROGRESS NOTES
CC: Pregnancy Ade Richards is a 26 y o  female [id number] who presents for prenatal visit at 25w3d by Estimated Date of Delivery: 19  HPI: Pt denies pelvic pressure, and frequency  Occasional Back Pain; No vaginal pain, no contractions, no LOF or bleeding, Active fetus  Past obstetric, gynecologic, medical, surgical, family, and social history reviewed and updated  ROS: Feels well  No frequent or severe HA or RUQ pain  Exam: VSS, abd- soft, NT, gravid, Fundus-NT, Back- no CVA   Pt is in no acute distress        A/P:            1  Prenatal Visit           2   labor precautions discussed          3  Low lying placenta - has f u appt with [facility]          4   [facility] 18

## 2024-02-02 DIAGNOSIS — Z00.6 ENCOUNTER FOR EXAMINATION FOR NORMAL COMPARISON OR CONTROL IN CLINICAL RESEARCH PROGRAM: ICD-10-CM

## 2024-03-22 ENCOUNTER — ANNUAL EXAM (OUTPATIENT)
Dept: OBGYN CLINIC | Facility: CLINIC | Age: 39
End: 2024-03-22

## 2024-03-22 VITALS
DIASTOLIC BLOOD PRESSURE: 78 MMHG | HEIGHT: 67 IN | WEIGHT: 192 LBS | SYSTOLIC BLOOD PRESSURE: 118 MMHG | BODY MASS INDEX: 30.13 KG/M2

## 2024-03-22 DIAGNOSIS — Z30.9 ENCOUNTER FOR CONTRACEPTIVE MANAGEMENT, UNSPECIFIED TYPE: ICD-10-CM

## 2024-03-22 DIAGNOSIS — Z01.419 ENCOUNTER FOR GYNECOLOGICAL EXAMINATION WITHOUT ABNORMAL FINDING: Primary | ICD-10-CM

## 2024-03-22 DIAGNOSIS — Z12.31 ENCOUNTER FOR SCREENING MAMMOGRAM FOR BREAST CANCER: ICD-10-CM

## 2024-03-22 PROBLEM — O09.521 ADVANCED MATERNAL AGE IN MULTIGRAVIDA, FIRST TRIMESTER: Status: RESOLVED | Noted: 2022-03-31 | Resolved: 2024-03-22

## 2024-03-22 PROBLEM — O99.210 OBESITY IN PREGNANCY: Status: RESOLVED | Noted: 2022-04-12 | Resolved: 2024-03-22

## 2024-03-22 PROBLEM — Z86.59 HISTORY OF POSTPARTUM DEPRESSION, CURRENTLY PREGNANT IN FIRST TRIMESTER: Status: RESOLVED | Noted: 2022-04-12 | Resolved: 2024-03-22

## 2024-03-22 PROBLEM — O34.219 HISTORY OF CESAREAN DELIVERY, CURRENTLY PREGNANT: Status: RESOLVED | Noted: 2022-03-31 | Resolved: 2024-03-22

## 2024-03-22 PROBLEM — O09.291 HISTORY OF GESTATIONAL DIABETES IN PRIOR PREGNANCY, CURRENTLY PREGNANT IN FIRST TRIMESTER: Status: RESOLVED | Noted: 2022-04-12 | Resolved: 2024-03-22

## 2024-03-22 PROBLEM — O99.891 HISTORY OF POSTPARTUM DEPRESSION, CURRENTLY PREGNANT IN FIRST TRIMESTER: Status: RESOLVED | Noted: 2022-04-12 | Resolved: 2024-03-22

## 2024-03-22 PROBLEM — Z30.09 CONSULTATION FOR FEMALE STERILIZATION: Status: RESOLVED | Noted: 2022-03-31 | Resolved: 2024-03-22

## 2024-03-22 PROBLEM — O28.3 INCREASED NUCHAL TRANSLUCENCY SPACE ON FETAL ULTRASOUND: Status: RESOLVED | Noted: 2022-04-12 | Resolved: 2024-03-22

## 2024-03-22 PROBLEM — Z86.32 HISTORY OF GESTATIONAL DIABETES IN PRIOR PREGNANCY, CURRENTLY PREGNANT IN FIRST TRIMESTER: Status: RESOLVED | Noted: 2022-04-12 | Resolved: 2024-03-22

## 2024-03-22 PROBLEM — O09.299 HISTORY OF MACROSOMIA IN INFANT IN PRIOR PREGNANCY, CURRENTLY PREGNANT: Status: RESOLVED | Noted: 2022-04-13 | Resolved: 2024-03-22

## 2024-03-22 PROBLEM — Z3A.16 16 WEEKS GESTATION OF PREGNANCY: Status: RESOLVED | Noted: 2022-04-12 | Resolved: 2024-03-22

## 2024-03-22 PROCEDURE — G0476 HPV COMBO ASSAY CA SCREEN: HCPCS | Performed by: PHYSICIAN ASSISTANT

## 2024-03-22 PROCEDURE — 99396 PREV VISIT EST AGE 40-64: CPT | Performed by: PHYSICIAN ASSISTANT

## 2024-03-22 PROCEDURE — G0124 SCREEN C/V THIN LAYER BY MD: HCPCS | Performed by: PATHOLOGY

## 2024-03-22 PROCEDURE — G0145 SCR C/V CYTO,THINLAYER,RESCR: HCPCS | Performed by: PATHOLOGY

## 2024-03-22 RX ORDER — NORGESTIMATE AND ETHINYL ESTRADIOL 7DAYSX3 LO
1 KIT ORAL DAILY
Qty: 28 TABLET | Refills: 3 | Status: SHIPPED | OUTPATIENT
Start: 2024-03-22

## 2024-03-22 NOTE — PROGRESS NOTES
Assessment/Plan:    No problem-specific Assessment & Plan notes found for this encounter.       Diagnoses and all orders for this visit:    Encounter for gynecological examination without abnormal finding  -     Liquid-based pap, screening    Encounter for contraceptive management, unspecified type  -     norgestimate-ethinyl estradiol (Ortho Tri-Cyclen Lo) 0.18/0.215/0.25 MG-25 MCG per tablet; Take 1 tablet by mouth daily    Encounter for screening mammogram for breast cancer  -     Mammo screening bilateral w 3d & cad; Future        Pap done.  Order for mammogram entered.  Will switch patient's OCP.  Rx for Ortho Tri-Cyclen Lo sent to pharmacy.  Finish current pill pack, then start new medication.  Continue to take pill every day at the same time and do not skip doses.  F/u in 3 mos for birth control check.  Call with problems in the interim.    Subjective:      Patient ID: Giana Dave is a 38 y.o. female.    Patient is here for yearly gyn exam.  States she is doing well overall.  Periods on Micronor are irregular and last for 3-4 days.  She denies heavy bleeding and severe cramping.  Has been having worsening cystic acne flares since starting this medication; would like to try a different OCP.  Denies bowel/bladder changes, pelvic pain, bloating, abdominal pain, n/v, change in appetite, and thyroid disease.  Had an abnormal Pap in 2021; colposcopy was benign.  Pap in 2022 was normal.    Patient is due for first mammogram.  She is performing self-breast exam.  Denies new masses, skin changes, nipple discharge, and pain/tenderness.        The following portions of the patient's history were reviewed and updated as appropriate: allergies, current medications, past family history, past medical history, past social history, past surgical history, and problem list.    Review of Systems   Constitutional:  Negative for appetite change and unexpected weight change.   Cardiovascular:         No masses, skin  "changes, nipple discharge, and pain/tenderness.   Gastrointestinal:  Negative for abdominal distention, abdominal pain, constipation, diarrhea, nausea and vomiting.   Genitourinary:  Negative for difficulty urinating, dysuria, frequency, genital sores, hematuria, menstrual problem, pelvic pain, urgency, vaginal bleeding, vaginal discharge and vaginal pain.         Objective:      /78 (BP Location: Left arm, Patient Position: Sitting, Cuff Size: Adult)   Ht 5' 7\" (1.702 m)   Wt 87.1 kg (192 lb)   LMP  (LMP Unknown)   BMI 30.07 kg/m²          Physical Exam  Vitals reviewed. Exam conducted with a chaperone present.   Constitutional:       Appearance: Normal appearance. She is well-developed.   Neck:      Thyroid: No thyromegaly.   Pulmonary:      Effort: Pulmonary effort is normal.   Chest:   Breasts:     Breasts are symmetrical.      Right: Normal. No swelling, bleeding, inverted nipple, mass, nipple discharge, skin change or tenderness.      Left: Normal. No swelling, bleeding, inverted nipple, mass, nipple discharge, skin change or tenderness.   Abdominal:      General: There is no distension.      Palpations: Abdomen is soft.      Tenderness: There is no abdominal tenderness.   Genitourinary:     General: Normal vulva.      Pubic Area: No rash.       Labia:         Right: No rash, tenderness, lesion or injury.         Left: No rash, tenderness, lesion or injury.       Vagina: Normal. No vaginal discharge, erythema, tenderness or bleeding.      Cervix: Normal.      Uterus: Normal.       Adnexa: Right adnexa normal and left adnexa normal.        Right: No mass, tenderness or fullness.          Left: No mass, tenderness or fullness.     Musculoskeletal:      Cervical back: Neck supple.   Lymphadenopathy:      Cervical: No cervical adenopathy.      Upper Body:      Right upper body: No supraclavicular or axillary adenopathy.      Left upper body: No supraclavicular or axillary adenopathy.      Lower Body: No " right inguinal adenopathy. No left inguinal adenopathy.   Skin:     General: Skin is warm and dry.   Neurological:      Mental Status: She is alert and oriented to person, place, and time.   Psychiatric:         Behavior: Behavior normal. Behavior is cooperative.         Thought Content: Thought content normal.         Judgment: Judgment normal.

## 2024-03-29 LAB
LAB AP GYN PRIMARY INTERPRETATION: ABNORMAL
Lab: ABNORMAL
PATH INTERP SPEC-IMP: ABNORMAL

## 2024-04-04 ENCOUNTER — TELEPHONE (OUTPATIENT)
Dept: OBGYN CLINIC | Facility: CLINIC | Age: 39
End: 2024-04-04

## 2024-04-04 DIAGNOSIS — B96.89 BV (BACTERIAL VAGINOSIS): Primary | ICD-10-CM

## 2024-04-04 DIAGNOSIS — N76.0 BV (BACTERIAL VAGINOSIS): Primary | ICD-10-CM

## 2024-04-04 RX ORDER — METRONIDAZOLE 500 MG/1
500 TABLET ORAL EVERY 12 HOURS SCHEDULED
Qty: 14 TABLET | Refills: 0 | Status: SHIPPED | OUTPATIENT
Start: 2024-04-04 | End: 2024-04-11

## 2024-04-04 NOTE — TELEPHONE ENCOUNTER
Spoke with patient regarding Pap results - ASCUS with positive other HR HPV. Also showed presence of BV.  Rx for metronidazole 500 mg BID x 7 days sent to pharmacy.  Instructed patient to avoid alcohol while on abx.  Will repeat Pap in 1 year.  Call with further questions.

## 2024-04-05 DIAGNOSIS — Z30.9 ENCOUNTER FOR CONTRACEPTIVE MANAGEMENT, UNSPECIFIED TYPE: ICD-10-CM

## 2024-04-05 RX ORDER — NORGESTIMATE AND ETHINYL ESTRADIOL
1 KIT DAILY
Qty: 84 TABLET | Refills: 1 | Status: SHIPPED | OUTPATIENT
Start: 2024-04-05

## 2025-03-25 DIAGNOSIS — Z30.9 ENCOUNTER FOR CONTRACEPTIVE MANAGEMENT, UNSPECIFIED TYPE: ICD-10-CM

## 2025-03-26 RX ORDER — NORGESTIMATE AND ETHINYL ESTRADIOL
1 KIT DAILY
Qty: 84 TABLET | Refills: 1 | OUTPATIENT
Start: 2025-03-26

## (undated) DEVICE — Device

## (undated) DEVICE — ADHESIVE SKN CLSR HISTOACRYL FLEX 0.5ML LF

## (undated) DEVICE — SKIN MARKER DUAL TIP WITH RULER CAP, FLEXIBLE RULER AND LABELS: Brand: DEVON

## (undated) DEVICE — GAUZE SPONGES,16 PLY: Brand: CURITY

## (undated) DEVICE — SUT MONOCRYL 4-0 PS-2 27 IN Y426H

## (undated) DEVICE — PACK C-SECTION PBDS

## (undated) DEVICE — ABDOMINAL PAD: Brand: DERMACEA

## (undated) DEVICE — TELFA NON-ADHERENT ABSORBENT DRESSING: Brand: TELFA

## (undated) DEVICE — CHLORAPREP HI-LITE 10.5ML ORANGE

## (undated) DEVICE — GLOVE PI ULTRA TOUCH SZ.7.0

## (undated) DEVICE — SUT VICRYL 0 CT-1 27 IN J260H

## (undated) DEVICE — SUT VICRYL 2-0 CT-1 36 IN VR945

## (undated) DEVICE — SUT PLAIN 3-0 CT-1 27 IN 842H

## (undated) DEVICE — SUT VICRYL 0 CTX 36 IN J978H